# Patient Record
Sex: MALE | Race: WHITE
[De-identification: names, ages, dates, MRNs, and addresses within clinical notes are randomized per-mention and may not be internally consistent; named-entity substitution may affect disease eponyms.]

---

## 2018-07-24 LAB
BUN BLD-MCNC: 25 MG/DL (ref 7–18)
GLUCOSE SERPLBLD-MCNC: 99 MG/DL (ref 74–106)
HCT VFR BLD CALC: 43 % (ref 39.6–49)
LYMPHOCYTES # SPEC AUTO: 1.5 K/UL (ref 0.7–4.9)
MCH RBC QN AUTO: 30.8 PG (ref 27–35)
MCV RBC: 90.9 FL (ref 80–100)
PMV BLD: 8.8 FL (ref 7.6–11.3)
POTASSIUM SERPL-SCNC: 3.7 MMOL/L (ref 3.5–5.1)
RBC # BLD: 4.73 M/UL (ref 4.33–5.43)

## 2018-07-24 NOTE — EKG
Test Date:    2018-07-24               Test Time:    16:57:07

Technician:   KIKE                                    

                                                     

MEASUREMENT RESULTS:                                       

Intervals:                                           

Rate:         67                                     

TX:           160                                    

QRSD:         100                                    

QT:           420                                    

QTc:          443                                    

Axis:                                                

P:            35                                     

TX:           160                                    

QRS:          -12                                    

T:            42                                     

                                                     

INTERPRETIVE STATEMENTS:                                       

                                                     

Normal sinus rhythm

Nonspecific T wave abnormality

Abnormal ECG

Compared to ECG 12/08/2016 15:36:12

T-wave abnormality now present

ST (T wave) deviation no longer present



Electronically Signed On 07-24-18 17:56:28 CDT by Diogo Garvey

## 2018-07-25 ENCOUNTER — HOSPITAL ENCOUNTER (OUTPATIENT)
Dept: HOSPITAL 97 - OR | Age: 63
Discharge: HOME HEALTH | End: 2018-07-25
Attending: SURGERY
Payer: COMMERCIAL

## 2018-07-25 VITALS — SYSTOLIC BLOOD PRESSURE: 133 MMHG | OXYGEN SATURATION: 97 % | TEMPERATURE: 97 F | DIASTOLIC BLOOD PRESSURE: 77 MMHG

## 2018-07-25 DIAGNOSIS — F17.210: ICD-10-CM

## 2018-07-25 DIAGNOSIS — L02.215: Primary | ICD-10-CM

## 2018-07-25 DIAGNOSIS — E78.00: ICD-10-CM

## 2018-07-25 DIAGNOSIS — L72.0: ICD-10-CM

## 2018-07-25 DIAGNOSIS — I10: ICD-10-CM

## 2018-07-25 PROCEDURE — 87075 CULTR BACTERIA EXCEPT BLOOD: CPT

## 2018-07-25 PROCEDURE — 36415 COLL VENOUS BLD VENIPUNCTURE: CPT

## 2018-07-25 PROCEDURE — 93005 ELECTROCARDIOGRAM TRACING: CPT

## 2018-07-25 PROCEDURE — 80048 BASIC METABOLIC PNL TOTAL CA: CPT

## 2018-07-25 PROCEDURE — 88305 TISSUE EXAM BY PATHOLOGIST: CPT

## 2018-07-25 PROCEDURE — 88304 TISSUE EXAM BY PATHOLOGIST: CPT

## 2018-07-25 PROCEDURE — 0J9B0ZZ DRAINAGE OF PERINEUM SUBCUTANEOUS TISSUE AND FASCIA, OPEN APPROACH: ICD-10-PCS

## 2018-07-25 PROCEDURE — 0WBMXZX EXCISION OF MALE PERINEUM, EXTERNAL APPROACH, DIAGNOSTIC: ICD-10-PCS

## 2018-07-25 PROCEDURE — 87070 CULTURE OTHR SPECIMN AEROBIC: CPT

## 2018-07-25 PROCEDURE — 85025 COMPLETE CBC W/AUTO DIFF WBC: CPT

## 2018-07-25 PROCEDURE — 87205 SMEAR GRAM STAIN: CPT

## 2018-07-25 NOTE — OP
Date of Procedure:  07/25/2018



Surgeon:  Sid Calvo MD



Preoperative Diagnosis:  Tender perianal subcutaneous mass.



Postoperative Diagnoses:  Tender perianal subcutaneous mass, perianal abscess.



Procedures:  

1.Excisional biopsy of tender perineal subcutaneous mass.

2.Drainage of complex perineal abscess, 8 x 4 x 2 cm.



Specimen:  Cyst with abscess.



Findings:  The patient had a complex perineal abscess about 8 x 4 x 2 cm with multiple loculations, t
unneled anterior to the right of the base of the scrotum, although does not go inside the scrotum.



Anesthesia:  General plus local.



Indications:  This is a case of a male, who came to us with perineal tenderness and the mass.  It has
 drained in the past, not any more.  It is tender, it can barely be seen.  It is palpated.  Benefits,
 alternatives, and risks of excision were fully explained, which include, but are not limited to infe
ction, bleeding, damage to adjacent structures, anesthesia complication, MI, even death.  He also und
erstands this may not relieve any symptoms.  He might need more than one surgical intervention.  I di
scussed the case with his cardiologist this morning from [facility], who gave us clearance to this procedur
e, although this patient has a pending angiogram in next few weeks that it should be done.  The patie
nt understood.



Description Of Procedure:  The patient was brought to the operating room.  The area of concern was ma
rked by me and the patient.  The patient was placed in supine position.  Anesthesia was done without 
complication.  The perineal area was prepped and draped in a sterile fashion after putting him in lit
hotomy position.  An incision was made over the area of concern.  Immediately we noticed the patient 
has a cystic mass in that area with an underlying abscess.  The mass was excised that led us into an 
abscess cavity that tracked posteriorly, but it does not go into the rectum or near the anus, go ante
riorly into the base of the scrotum on the right side, but does not penetrate the scrotum.  Multiple 
loculations were explored and opened.  Cultures were obtained.  Irrigation was done and then the area
 was packed with quarter of an inch Nu Gauze.  The patient tolerated the procedure well.  Sponge coun
t and instrument counts were correct.  Local anesthetic was applied over the area.  The patient sent 
to recovery in stable condition.





HM/MODL

DD:  07/25/2018 14:02:21Voice ID:  [id number]

DT:  07/25/2018 14:19:42Report ID:  [id number]

## 2018-07-25 NOTE — DS
Date of Discharge:  07/25/2018



Diagnosis:  Tender perineal subcutaneous mass.



Procedure:  Excisional biopsy of tender perineal subcutaneous mass and drainage of complex perineal a
bscess.



Disposition:  Home.



Activity:  As tolerated.  No lifting.  The patient advises home health agency for Nu Gauze packing qu
arter of an inch daily follow in my office in 1 week.  Call for appointment on [phone].



Medications:  See orders.





ÁNGEL/LIANA

DD:  07/25/2018 14:02:21Voice ID:  [id number]

DT:  07/25/2018 14:20:52Report ID:  [id number]

## 2018-07-25 NOTE — P.BOP
Preoperative diagnosis: tender Perineal subcutaneous mass


Postoperative diagnosis: same plus perineal abscess


Primary procedure: 1. Excisional biosy of tender Perineal subcutaneous mass 


Secondary procedure: 2. drainage of complex perineal abscess [id number]


Estimated blood loss: <10cc


Specimen: cyst and abscess


Findings: complex perineal abscess [id number] wit multiple loculation tunneling 

anterior


Anesthesia: General


Complications: None


Drain(s): Other


Transferred to: Recovery Room


Condition: Good

## 2019-06-05 ENCOUNTER — HOSPITAL ENCOUNTER (OUTPATIENT)
Dept: HOSPITAL 97 - OR | Age: 64
Discharge: HOME | End: 2019-06-05
Attending: SURGERY
Payer: COMMERCIAL

## 2019-06-05 VITALS — TEMPERATURE: 97.5 F | SYSTOLIC BLOOD PRESSURE: 109 MMHG | DIASTOLIC BLOOD PRESSURE: 69 MMHG | OXYGEN SATURATION: 97 %

## 2019-06-05 DIAGNOSIS — Z79.899: ICD-10-CM

## 2019-06-05 DIAGNOSIS — L03.315: ICD-10-CM

## 2019-06-05 DIAGNOSIS — F17.210: ICD-10-CM

## 2019-06-05 DIAGNOSIS — L02.215: Primary | ICD-10-CM

## 2019-06-05 DIAGNOSIS — E78.00: ICD-10-CM

## 2019-06-05 DIAGNOSIS — Z95.5: ICD-10-CM

## 2019-06-05 DIAGNOSIS — Z79.01: ICD-10-CM

## 2019-06-05 DIAGNOSIS — K21.9: ICD-10-CM

## 2019-06-05 DIAGNOSIS — I25.2: ICD-10-CM

## 2019-06-05 DIAGNOSIS — I11.9: ICD-10-CM

## 2019-06-05 LAB
BUN BLD-MCNC: 18 MG/DL (ref 7–18)
GLUCOSE SERPLBLD-MCNC: 118 MG/DL (ref 74–106)
HCT VFR BLD CALC: 39.6 % (ref 39.6–49)
LYMPHOCYTES # SPEC AUTO: 1.4 K/UL (ref 0.7–4.9)
PMV BLD: 8.2 FL (ref 7.6–11.3)
POTASSIUM SERPL-SCNC: 4.1 MMOL/L (ref 3.5–5.1)
RBC # BLD: 4.46 M/UL (ref 4.33–5.43)

## 2019-06-05 PROCEDURE — 80048 BASIC METABOLIC PNL TOTAL CA: CPT

## 2019-06-05 PROCEDURE — 36415 COLL VENOUS BLD VENIPUNCTURE: CPT

## 2019-06-05 PROCEDURE — 85025 COMPLETE CBC W/AUTO DIFF WBC: CPT

## 2019-06-05 PROCEDURE — 0J9B0ZZ DRAINAGE OF PERINEUM SUBCUTANEOUS TISSUE AND FASCIA, OPEN APPROACH: ICD-10-PCS

## 2019-06-05 PROCEDURE — 71046 X-RAY EXAM CHEST 2 VIEWS: CPT

## 2019-06-05 PROCEDURE — 93005 ELECTROCARDIOGRAM TRACING: CPT

## 2019-06-05 RX ADMIN — BUPIVACAINE HYDROCHLORIDE ONE ML: 5 INJECTION, SOLUTION EPIDURAL; INTRACAUDAL; PERINEURAL at 10:22

## 2019-06-05 RX ADMIN — BUPIVACAINE HYDROCHLORIDE ONE ML: 5 INJECTION, SOLUTION EPIDURAL; INTRACAUDAL; PERINEURAL at 10:27

## 2019-06-05 NOTE — RAD REPORT
EXAM DESCRIPTION:  RAD - Chest Pa And Lat (2 Views) - 6/5/2019 9:34 am

 

CLINICAL HISTORY:  preop

Chest pain.

 

COMPARISON:  Abdomen 1 View (KUB) dated 6/7/2016; Abdomen 1 View (KUB) dated 5/22/2016; Abdomen 1 Vie
w (KUB) dated 5/20/2016; ABDOMEN 1 VIEW KUB dated 3/26/2013; Outpt Chest Pa/Lat (2 Views) dated 12/8/
2016

 

FINDINGS:  Emphysematous changes are present throughout the lungs. The heart is normal in size. No di
splaced fractures.

 

IMPRESSION:  Advanced COPD.

## 2019-06-05 NOTE — XMS REPORT
[facility]

 Created on:2018



Patient:ALFONSO BLANK

Sex:Male

:1955

External Reference #:[id number]





Demographics







 Address  [address] [place] [address]

 

 Phone  [phone]

 

 Preferred Language  en

 

 Marital Status  Unknown

 

 [employer] Affiliation  Unknown

 

 Race  White

 

 Ethnic Group  Unknown









Author







 Organization  [employer]









Care Team Providers







 Name  Role  Phone

 

 Danielito Crain  Provider Role  Unavailable









Allergies, Adverse Reactions, Alerts







 Substance  Reaction  Event Type

 

 N.K.D.A.  Info Not Available  Non Drug Allergy







Problems







 Problem Type  Condition  Code  Onset Dates  Condition Status

 

 Problem  Pain of joint of left ankle and  M25.572    Active



   foot      

 

 Problem  Pain, joint, shoulder, left  M25.512    Active

 

 Problem  Post-traumatic osteoarthritis of  M19.172    Active



   left ankle      

 

 Assessment  Post-traumatic osteoarthritis of  M19.172    Active



   left ankle      

 

 Assessment  Pain of joint of left ankle and  M25.572    Active



   foot      







Medications







 Medication  Code  Code  Instructions  Start  End  Status  Dosage



   System      Date  Date    

 

 Metoprolol  NDC  [id number]  25 MG Oral      Active  (Prior



 Tartrate              Auth: Rx



               Ref#:0000



               [id number])

 

 Tramadol-Acetamin  NDC  [id number]  37.5-325 MG      Active  (Schedule



 ophen      Oral        IV Drug)



               (Prior



               Auth: Rx



               Ref#:0000



               [id number])

 

 Chantix  NDC  [id number]  1 MG Oral      Active  (Prior



               Auth: Rx



               Ref#:0000



               [id number])

 

 Gabapentin  NDC  37329835691  300 MG Oral      Active  (Prior



               Auth: Rx



               Ref#:0000



               [id number])

 

 Aspirin  NDC  81428740516  81 MG Oral      Active  (Prior



               Auth: Rx



               Ref#:0000



               [id number])

 

 Carvedilol  NDC  42319981601  6.25 MG Oral      Active  (Prior



               Auth: Rx



               Ref#:0000



               [id number])

 

 ProAir HFA  NDC  [id number]  108 (90 Base)      Active  (Prior



       MCG/ACT        Auth: Rx



       Inhalation        Ref#:0000



               [id number])

 

 Sulfamethoxazole-  NDC  [id number]  800-160 MG Oral      Active  (Prior



 Trimethoprim              Auth: Rx



               Ref#:0000



               [id number])

 

 Nitroglycerin  NDC  [id number]  0.4 MG      Active  (Prior



       Sublingual        Auth: Rx



               Ref#:0000



               [id number])

 

 Trelegy Ellipta  NDC  73225136927  100-62.5-25      Active  (Prior



       MCG/INH        Auth: Rx



       Inhalation        Ref#:0000



               [id number])

 

 Spiriva  ND  [id number]  18 MCG      Active  (Prior



 HandiHaler      Inhalation        Auth: Rx



               Ref#:0000



               [id number])

 

 Atorvastatin  NDC  54361456811  40 MG Oral      Active  (Prior



 Calcium              Auth: Rx



               Ref#:0000



               [id number])

 

 Brilinta  NDC  99145237123  90 MG Oral      Active  (Prior



               Auth: Rx



               Ref#:0000



               [id number])

 

 Losartan  NDC  36649105347  50 MG Oral      Active  (Prior



 Potassium              Auth: Rx



               Ref#:0000



               [id number])







Results

No Known Results



Summary Purpose

[employer] Submission

## 2019-06-05 NOTE — EKG
Test Date:    2019-06-05               Test Time:    08:55:13

Technician:   SUN                                     

                                                     

MEASUREMENT RESULTS:                                       

Intervals:                                           

Rate:         64                                     

KS:           170                                    

QRSD:         102                                    

QT:           422                                    

QTc:          435                                    

Axis:                                                

P:            52                                     

KS:           170                                    

QRS:          33                                     

T:            51                                     

                                                     

INTERPRETIVE STATEMENTS:                                       

                                                     

Normal sinus rhythm

Incomplete right bundle branch block

Borderline ECG

Compared to ECG 07/24/2018 16:57:07

Incomplete right bundle-branch block now present

T-wave abnormality no longer present



Electronically Signed On 06-05-19 11:22:40 CDT by Ezequiel Milligan

## 2019-06-05 NOTE — P.BOP
Preoperative diagnosis: perineal abscess, on anticoagulation for heart disease


Postoperative diagnosis: same


Primary procedure: Incision and drainage of complex perineal abscess 6x4 cm


Estimated blood loss: <10cc


Findings: abscess


Anesthesia: General


Complications: None


Transferred to: Recovery Room


Condition: Good

## 2019-06-05 NOTE — XMS REPORT
Patient Summary Document

 Created on:2019



Patient:ALFONSO BLANK

Sex:Male

:1955

External Reference #:[id number]





Demographics







 Address  [address] [place] [address]

 

 Home Phone  [phone]

 

 Work Phone  [phone] w

 

 Email Address  [email or web address]

 

 Preferred Language  Unknown

 

 Marital Status  Unknown

 

 Sabianist Affiliation  Unknown

 

 Race  Unknown

 

 Additional Race(s)  Unavailable

 

 Ethnic Group  Unknown









Author







 Organization  [facility]

 

 Address  [address] [place] [address] Dr. Abdullahi [address] [place] [address]

 

 Phone  [phone]









Care Team Providers







 Name  Role  Phone

 

 Unavailable  Unavailable  Unavailable









Problems

This patient has no known problems.



Allergies, Adverse Reactions, Alerts

This patient has no known allergies or adverse reactions.



Medications

This patient has no known medications.

## 2019-06-06 NOTE — OP
Date of Procedure:  06/05/2019



Surgeon:  Sid Calvo MD



Preoprative Diagnosis:  Perineal abscess.  The patient has a history of heart disease and on current 
anticoagulation.



Postoperative Diagnosis:  Perineal abscess.  The patient has a history of heart disease and on curren
t anticoagulation.



Procedure:  Incision and drainage of complex perineal abscess of about 6 x 4 cm.



Anesthesia:  General plus local.



Findings:  A large complex abscess with multiple loculations present.



Indications:  This is a case of a 64-year-old patient, comes to us with a rapidly expanding abscess o
n the perineal region.  He had another 1 in the past several years ago, a little bit away from this a
sandy.  This new one in 2 days doubled in size.  I discussed the case with the cardiologist.  He does n
ot want me to stop the anticoagulation other than the aspirin and probably 1 dose of the other antico
agulation.  That make this case limit, since I anticipate the patient with blood thinners, but at the
 same time, he needs the incision and drainage because in the past he did not do this on time and it 
developed into a large cavity.  He understands the risks and we understand the risks.  Cardiologist yinka torrez explained to me the reason for this.  He had an MI about a year ago with stent placement, and e
slade though 6 months is okay just to off his anticoagulation, he preferred just to keep it going.  He 
understands the limitations of this surgery, which include obviously more bleeding than usual.  Benef
its, alternatives, and risks fully explained including, but not limited to infection, bleeding, damag
e to adjacent structures, anesthesia complication, recurrence, MI, and even death.  He also understan
ds this may not relieve in any symptoms, he might need more than 1 surgical intervention.  He underst
ood, signed a consent.



Description Of Procedure:  The patient was brought to the operating room, placed in supine position. 
 Anesthesia was done without complication.  The patient was placed in lithotomy position with proper 
protection.  Perineal area was prepped and draped in sterile fashion.  An incision was made over the 
area of the abscess and a discharge was obtained with purulent discharge for culture.  All the locula
tions were explored and opened.  Profuse irrigation was done over that area.  Absolute hemostasis was
 obtained and then the area was packed with wet-to-dry dressing.  The patient tolerated the procedure
 well.  The patient was sent to recovery room in stable condition.



Disposition:  Home.



Activity:  As tolerated.  No heavy lifting.



Followup:  Follow up in my office in 1 week.  Call for appointment, [phone].  The patient will do we
t-to-dry dressing daily.  The patient's wife states she feels comfortable doing the dressing changes.
  We will see the patient in 1 week.



Medications:  See orders.





ÁNGEL/LIANA

DD:  06/05/2019 16:57:31Voice ID:  [id number]

DT:  06/06/2019 03:21:59Report ID:  [id number]

## 2022-08-09 ENCOUNTER — HOSPITAL ENCOUNTER (EMERGENCY)
Dept: HOSPITAL 97 - ER | Age: 67
Discharge: HOME | End: 2022-08-09
Payer: COMMERCIAL

## 2022-08-09 VITALS — TEMPERATURE: 97.4 F | DIASTOLIC BLOOD PRESSURE: 80 MMHG | SYSTOLIC BLOOD PRESSURE: 160 MMHG | OXYGEN SATURATION: 97 %

## 2022-08-09 DIAGNOSIS — I10: ICD-10-CM

## 2022-08-09 DIAGNOSIS — K56.41: Primary | ICD-10-CM

## 2022-08-09 DIAGNOSIS — F17.210: ICD-10-CM

## 2022-08-09 LAB
ALBUMIN SERPL BCP-MCNC: 3.4 G/DL (ref 3.4–5)
ALP SERPL-CCNC: 130 U/L (ref 45–117)
ALT SERPL W P-5'-P-CCNC: 25 U/L (ref 12–78)
AST SERPL W P-5'-P-CCNC: 8 U/L (ref 15–37)
BUN BLD-MCNC: 18 MG/DL (ref 7–18)
GLUCOSE SERPLBLD-MCNC: 175 MG/DL (ref 74–106)
HCT VFR BLD CALC: 36.9 % (ref 39.6–49)
LIPASE SERPL-CCNC: 46 U/L (ref 73–393)
LYMPHOCYTES # SPEC AUTO: 1.6 K/UL (ref 0.7–4.9)
MCV RBC: 86.6 FL (ref 80–100)
PMV BLD: 7.3 FL (ref 7.6–11.3)
POTASSIUM SERPL-SCNC: 4 MMOL/L (ref 3.5–5.1)
RBC # BLD: 4.27 M/UL (ref 4.33–5.43)

## 2022-08-09 PROCEDURE — 85025 COMPLETE CBC W/AUTO DIFF WBC: CPT

## 2022-08-09 PROCEDURE — 83690 ASSAY OF LIPASE: CPT

## 2022-08-09 PROCEDURE — 36415 COLL VENOUS BLD VENIPUNCTURE: CPT

## 2022-08-09 PROCEDURE — 80053 COMPREHEN METABOLIC PANEL: CPT

## 2022-08-09 NOTE — XMS REPORT
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           



                                                  needed for           



                                                  nausea.           

 

     traMADol      2-0      Yes       [id number] 100mg Q6H  Take 2           [facility]



     (Ultram) 50      5-04                               tablets           [facility]

h



     MG tablet      00:00:                               (100 mg           



               00                                 total) by           



                                                  mouth           



                                                  every 6           



                                                  (six)           



                                                  hours if           



                                                  needed for           



                                                  severe           



                                                  pain.           

 

     ondansetron      2-0      Yes       [id number] 8mg       Take 1          

 [facility]



     (Zofran) 8      5-04                               tablet (8           [facility]

th



     MG tablet      00:00:                               mg total)           



               00                                 by mouth           



                                                  every 8           



                                                  (eight)           



                                                  hours if           



                                                  needed for           



                                                  nausea.           

 

     traMADol      2022-0      Yes       [id number] 100mg Q6H  Take 2           [facility]



     (Ultram) 50      5-04                               tablets           [facility]

h



     MG tablet      00:00:                               (100 mg           



               00                                 total) by           



                                                  mouth           



                                                  every 6           



                                                  (six)           



                                                  hours if           



                                                  needed for           



                                                  severe           



                                                  pain.           

 

     ondansetron      2022-0      Yes       [id number] 8mg       Take 1          

 [facility]



     (Zofran) 8      5-04                               tablet (8           [facility]

th



     MG tablet      00:00:                               mg total)           



               00                                 by mouth           



                                                  every 8           



                                                  (eight)           



                                                  hours if           



                                                  needed for           



                                                  nausea.           

 

     gabapentin      2022-0      Yes            300mg      Take 300           [facility]

[facility]



     (NEURONTIN)      4-06                               mg by           [facility] of



     300 mg      13:01:                               mouth           [place]



     capsule      34                                 daily.           Medical



                                                                 Branch

 

     lansoprazol            Yes            15mg      Take 15 mg           

[facility]



     e         4-06                               by mouth 2           [facility]



     (PREVACID)      13:01:                               (two)           [place]



     15 mg      34                                 times           Medical



     capsule                                         daily.           Branch

 

     ascorbic      0      Yes            500mg      Take 500           [facility]

ers



     acid      4-06                               mg by           [facility] of



     (VITAMIN C)      13:01:                               mouth           [place]



     500 mg      34                                 daily.           Medical



     tablet                                                        Branch

 

     Docusate            Yes       [id number] 100mg      Take 100           

[facility]



     Sodium 100      4-06                               mg by           ity of



     mg tablet      13:01:                               mouth           [place]



               34                                 daily.           Medical



                                                                 Branch

 

     dutasteride            Yes            .5mg      Take 0.5           Un

an[facility]



     0.5 mg      4-06                               mg by           ity of



     capsule      13:01:                               mouth           [place]



               34                                 daily.           Medical



                                                                 Branch

 

     levocetiriz            Yes            5mg       Take 5 mg           U

nivers



     ine 5 mg      4-06                               by mouth           [facility] of



     tablet      13:01:                               every           [place]



               34                                 evening.           Medical



                                                                 Branch

 

     montelukast            Yes            10mg      Take 10 mg           

[facility]



     10 mg      4-06                               by mouth.           [facility]



     tablet      13:01:                                              [place]



               34                                                Medical



                                                                 Branch

 

     insulin            Yes            20U       inject 20           [facility]ive

rs



     degludec      4-06                               Units           ity of



     200 unit/mL      13:01:                               under the           [facility]

[facility]



     (3 mL) InPn      34                                 skin.           Medical



                                                  Inject 20           Branch



                                                  Units           



                                                  Daily           

 

     gabapentin            Yes            300mg      Take 300           Un

[facility]



     (NEURONTIN)      4-06                               mg by           ity of



     300 mg      13:01:                               mouth           [place]



     capsule      34                                 daily.           Medical



                                                                 Branch

 

     lansoprazol            Yes            15mg      Take 15 mg           

[facility]



     e         4-06                               by mouth 2           [facility]



     (PREVACID)      13:01:                               (two)           [place]



     15 mg      34                                 times           Medical



     capsule                                         daily.           Branch

 

     ascorbic            Yes            500mg      Take 500           [facility]

ers



     acid      4-06                               mg by           ity of



     (VITAMIN C)      13:01:                               mouth           [place]



     500 mg      34                                 daily.           Medical



     tablet                                                        Branch

 

     Docusate            Yes       [id number] 100mg      Take 100           

[facility]



     Sodium 100      4-06                               mg by           ity of



     mg tablet      13:01:                               mouth           [place]



               34                                 daily.           Medical



                                                                 Branch

 

     dutasteride      0      Yes            .5mg      Take 0.5           Un

an[facility]



     0.5 mg      4-06                               mg by           ity of



     capsule      13:01:                               mouth           [place]



               34                                 daily.           Medical



                                                                 Branch

 

     levocetiriz      0      Yes            5mg       Take 5 mg           U

nivers



     ine 5 mg      4-06                               by mouth           ity of



     tablet      13:01:                               every           [place]



               34                                 evening.           Medical



                                                                 Branch

 

     montelukast      0      Yes            10mg      Take 10 mg           

[facility]



     10 mg      4-06                               by mouth.           ity of



     tablet      13:01:                                              [place]



               34                                                Medical



                                                                 Branch

 

     insulin      -0      Yes            20U       inject 20           Unive

rs



     degludec      4-06                               Units           ity of



     200 unit/mL      13:01:                               under the           [facility]

[facility]



     [facility]                                 [facility]           [facility]



                                                  Inject 20           [facility]



                                                  Units           



                                                  Daily           

 

     ezetimibe      0      Yes       [id number] 10mg      Take 1           U

nivers



     10 mg      4-06                               tablet by           ity of



     tablet      00:00:                               mouth           [place]



               00                                 daily.           Medical



                                                                 Branch

 

     atorvastati            Yes       [id number] 80mg      Take 1          

 [facility]



     n 80 mg      4-06                               tablet by           ity of



     tablet      00:00:                               mouth           [place]



               00                                 daily.           Medical



                                                                 Branch

 

     carvediloL      0      Yes       [id number] 25mg      Take 1           

[facility]



     25 mg      4-06                               tablet by           ity of



     tablet      00:00:                               mouth 2           [place]



               00                                 (two)           Medical



                                                  times           Branch



                                                  daily.           

 

     NIFEdipine      -0      Yes       [id number] 30mg      Take 1           

[facility]



     XL 30 mg 24      4-06                               tablet by           ity

 of



     hr tablet      00:00:                               mouth 2           [place]



               00                                 (two)           Medical



                                                  times           Branch



                                                  daily.           

 

     ezetimibe      0      Yes       [id number] 10mg      Take 1           U

nivers



     10 mg      4-06                               tablet by           ity of



     tablet      00:00:                               mouth           [place]



               00                                 daily.           Medical



                                                                 Branch

 

     atorvastati      0      Yes       [id number] 80mg      Take 1          

 [facility]



     n 80 mg      4-06                               tablet by           ity of



     tablet      00:00:                               mouth           [place]



               00                                 daily.           Medical



                                                                 Branch

 

     carvediloL      0      Yes       [id number] 25mg      Take 1           

[facility]



     25 mg      4-06                               tablet by           ity of



     tablet      00:00:                               mouth 2           [place]



               00                                 (two)           Medical



                                                  times           Branch



                                                  daily.           

 

     NIFEdipine      -0      Yes       [id number] 30mg      Take 1           

[facility]



     XL 30 mg 24      4-06                               tablet by           ity

 of



     hr tablet      00:00:                               mouth 2           [place]



               00                                 (two)           Medical



                                                  times           Branch



                                                  daily.           

 

     NIFEdipine      -0 2022- No             30mg      Take 30 mg           

[facility]



     XL 30 mg 24      3- 04-06                          by mouth           ity

 of



     hr tablet      00:00: 00:00                          daily.           [place]



               00   :00                                          [facility]

 

     aspirin 81      -0      Yes                      (Prior           [facility]



     MG chewable      3-18                               Auth: Rx           [facility]

th



     tablet      09:54:                               Ref#:[id number]           



               58                                 [id number])           

 

     Fluticasone      -0      Yes                      (Prior           [facility]



     -Umeclidin-      3-18                               Auth: Rx           [facility]al

th



     [facility]      09:54:                               Ref#:[id number]           



     ([facility]      58                                 [id number])           



     Ellipta)                                                        



     100-62.5-25                                                        



     MCG/INH                                                        



     aerosol                                                        



     powder                                                        

 

     lansoprazol      2-0      Yes            15mg      Take 15 mg           

[facility]



     e         3-18                               by mouth.           Health



     (Prevacid)      09:54:                                              



     15 MG DR      58                                                



     capsule                                                        

 

     montelukast      -0      Yes            10mg      Take 10 mg           

[facility]



     (Singulair)      3-18                               by mouth.           Hea

lth



     10 MG      09:54:                                              



     tablet      58                                                

 

     aspirin 81      -0      Yes                      (Prior           [facility]



     MG chewable      3-18                               Auth: Rx           [facility]al

th



     tablet      09:54:                               Ref#:[id number]           



               58                                 [id number])           

 

     Fluticasone      -0      Yes                      (Prior           [facility]



     -Umeclidin-      3-18                               Auth: Rx           Heal

th



     [facility]      09:54:                               Ref#:[id number]           



     ([facility]      58                                 [id number])           



     Ellipta)                                                        



     100-62.5-25                                                        



     MCG/INH                                                        



     aerosol                                                        



     powder                                                        

 

     lansoprazol      -0      Yes            15mg      Take 15 mg           

[facility]



     e         3-18                               by mouth.           Health



     (Prevacid)      09:54:                                              



     15 MG DR      58                                                



     capsule                                                        

 

     montelukast      -0      Yes            10mg      Take 10 mg           

[facility]



     (Singulair)      3-18                               by mouth.           Hea

lth



     10 MG      09:54:                                              



     tablet      58                                                

 

     aspirin 81      -0      Yes                      (Prior           [facility]



     MG chewable      3-18                               Auth: Rx           Heal

th



     tablet      09:54:                               Ref#:[id number]           



               58                                 [id number])           

 

     Fluticasone      -0      Yes                      (Prior           [facility]



     -Umeclidin-      3-18                               Auth: Rx           Heal

th



     [facility]      09:54:                               Ref#:[id number]           



     ([facility]      58                                 [id number])           



     Ellipta)                                                        



     100-62.5-25                                                        



     MCG/INH                                                        



     aerosol                                                        



     powder                                                        

 

     lansoprazol      2-0      Yes            15mg      Take 15 mg           

[facility]



     e         3-18                               by mouth.           Health



     (Prevacid)      09:54:                                              



     15 MG DR      58                                                



     capsule                                                        

 

     montelukast      2022-0      Yes            10mg      Take 10 mg           

[facility]



     (Singulair)      3-18                               by mouth.           [facility]

lth



     10 MG      09:54:                                              



     tablet      58                                                

 

     aspirin 81      2-0      Yes                      (Prior           [facility]



     MG chewable      3-18                               Auth: Rx           Heal

th



     tablet      09:54:                               Ref#:[id number]           



               58                                 [id number])           

 

     Fluticasone      2022-0      Yes                      (Prior           [facility]



     -Umeclidin-      3-18                               Auth: Rx           Heal

th



     [facility]      09:54:                               Ref#:[id number]           



     ([facility]      58                                 [id number])           



     Ellipta)                                                        



     100-62.5-25                                                        



     MCG/INH                                                        



     aerosol                                                        



     powder                                                        

 

     lansoprazol      2022-0      Yes            15mg      Take 15 mg           

[facility]



     e         3-18                               by mouth.           Health



     (Prevacid)      09:54:                                              



     15 MG DR      58                                                



     capsule                                                        

 

     montelukast      2-0      Yes            10mg      Take 10 mg           

[facility]



     (Singulair)      3-18                               by mouth.           [facility]a

lth



     10 MG      09:54:                                              



     tablet      58                                                

 

     aspirin 81      -0      Yes                      (Prior           [facility]



     MG chewable      3-18                               Auth: Rx           Heal

th



     tablet      09:54:                               Ref#:[id number]           



               58                                 [id number])           

 

     Fluticasone      2-0      Yes                      (Prior           [facility]



     -Umeclidin-      3-18                               Auth: Rx           [facility]al

th



     [facility]      09:54:                               Ref#:[id number]           



     [facility]      [facility]                                 [id number])           



     Ellipta)                                                        



     100-62.5-25                                                        



     MCG/INH                                                        



     aerosol                                                        



     powder                                                        

 

     lansoprazol      2-0      Yes            15mg      Take 15 mg           

[facility]



     e         3-18                               by mouth.           Health



     (Prevacid)      09:54:                                              



     15 MG DR      58                                                



     capsule                                                        

 

     montelukast      2022-0      Yes            10mg      Take 10 mg           

[facility]



     (Singulair)      3-18                               by mouth.           [facility]a

lth



     10 MG      09:54:                                              



     tablet      58                                                

 

     aspirin 81      -0      Yes                      (Prior           [facility]



     MG chewable      3-18                               Auth: Rx           [facility]al

th



     tablet      09:54:                               Ref#:[id number]           



               58                                 [id number])           

 

     Fluticasone      2022-0      Yes                      (Prior           [facility]



     -Umeclidin-      3-18                               Auth: Rx           Heal

th



     [facility]      09:54:                               Ref#:[id number]           



     ([facility]      58                                 [id number])           



     Ellipta)                                                        



     100-62.5-25                                                        



     MCG/INH                                                        



     aerosol                                                        



     powder                                                        

 

     lansoprazol      2022-0      Yes            15mg      Take 15 mg           

[facility]



     e         3-18                               by mouth.           Health



     (Prevacid)      09:54:                                              



     15 MG DR      58                                                



     capsule                                                        

 

     montelukast            Yes            10mg      Take 10 mg           

[facility]



     (Singulair)      3-18                               by mouth.           [facility]

lth



     10 MG      09:54:                                              



     tablet      58                                                

 

     aspirin 81            Yes                      (Prior           [facility]



     MG chewable      3-18                               Auth: Rx           [facility]al

th



     tablet      09:54:                               Ref#:[id number]           



               58                                 [id number])           

 

     Fluticasone            Yes                      (Prior           [facility]



     -Umeclidin-      3-18                               Auth: Rx           [facility]

[facility]



     Vilant      09:54:                               Ref#:[id number]           



     (Trelegy      58                                 [id number])           



     Ellipta)                                                        



     100-62.5-25                                                        



     MCG/INH                                                        



     aerosol                                                        



     powder                                                        

 

     lansoprazol            Yes            15mg      Take 15 mg           

[facility]



     e         3-18                               by mouth.           Health



     (Prevacid)      09:54:                                              



     15 MG DR      58                                                



     capsule                                                        

 

     montelukast            Yes            10mg      Take 10 mg           

[facility]



     (Singulair)      3-18                               by mouth.           [facility]

[facility]



     10 MG      09:54:                                              



     tablet      58                                                

 

     Tresiba            Yes                      INJECT 55           [facility]



     FlexTouch      3-11                               UNITS           Health



     200 UNIT/ML      00:00:                               SUBCUTANEO           



     injection      00                                 USLY ONCE           



                                                  DAILY AT           



                                                  THE SAME           



                                                  TIME EACH           



                                                  DAY.           

 

     Tresiba            Yes                      INJECT 55           [facility]



     FlexTouch      3-11                               UNITS           Health



     200 UNIT/ML      00:00:                               SUBCUTANEO           



     injection      00                                 USLY ONCE           



                                                  DAILY AT           



                                                  THE SAME           



                                                  TIME EACH           



                                                  DAY.           

 

     Tresiba            Yes                      INJECT 55           [facility]



     FlexTouch      3-11                               UNITS           Health



     200 UNIT/ML      00:00:                               SUBCUTANEO           



     injection      00                                 USLY ONCE           



                                                  DAILY AT           



                                                  THE SAME           



                                                  TIME EACH           



                                                  DAY.           

 

     Tresiba            Yes                      INJECT 55           [facility]



     FlexTouch      3-11                               UNITS           Health



     200 UNIT/ML      00:00:                               SUBCUTANEO           



     injection      00                                 USLY ONCE           



                                                  DAILY AT           



                                                  THE SAME           



                                                  TIME EACH           



                                                  DAY.           

 

     Tresiba            Yes                      INJECT 55           [facility]



     FlexTouch      3-11                               UNITS           Health



     200 UNIT/ML      00:00:                               SUBCUTANEO           



     injection      00                                 USLY ONCE           



                                                  DAILY AT           



                                                  THE SAME           



                                                  TIME EACH           



                                                  DAY.           

 

     Tresiba            Yes                      INJECT 55           [facility]



     FlexTouch      3-11                               UNITS           Health



     200 UNIT/ML      00:00:                               SUBCUTANEO           



     injection      00                                 USLY ONCE           



                                                  DAILY AT           



                                                  THE SAME           



                                                  TIME EACH           



                                                  DAY.           

 

     Tresiba      -0      Yes                      INJECT 55           [facility]



     FlexTouch      3-11                               UNITS           Health



     200 UNIT/ML      00:00:                               SUBCUTANEO           



     injection      00                                 USLY ONCE           



                                                  DAILY AT           



                                                  THE SAME           



                                                  TIME EACH           



                                                  DAY.           

 

     losartan      -0      Yes                                     [facility]



     (Cozaar) 50      1-06                                              Health



     MG tablet      00:00:                                              



               00                                                

 

     losartan      -0      Yes                                     [facility]



     (Cozaar) 50      1-06                                              [facility]



     MG tablet      00:00:                                              



               00                                                

 

     losartan      -0      Yes                                     [facility]



     (Cozaar) 50      1-06                                              [facility]



     MG tablet      00:00:                                              



               00                                                

 

     losartan      -0      Yes                                     [facility]



     (Cozaar) 50      1-06                                              [facility]



     MG tablet      00:00:                                              



               00                                                

 

     losartan      -0      Yes                                     [facility]



     (Cozaar) 50      1-06                                              [facility]



     MG tablet      00:00:                                              



               00                                                

 

     losartan      -0      Yes                                     [facility]



     (Cozaar) 50      1-06                                              [facility]



     MG tablet      00:00:                                              



               00                                                

 

     losartan      -0      Yes                                     [facility]



     (Cozaar) 50      1-06                                              [facility]



     MG tablet      00:00:                                              



               00                                                

 

     losartan 50      -0      Yes            50mg      Take 50 mg           

[facility]



     mg tablet      -06                               by mouth 2           [facility] 

of



               00:00:                               (two)           [place]



               00                                 times           [facility]



                                                  daily.           [place]

 

     losartan 50      -0      Yes            50mg      Take 50 mg           

[facility]



     mg tablet      -06                               by mouth 2           [facility] 

of



               00:00:                               (two)           [place]



               00                                 times           [facility]



                                                  daily.           [facility]

 

     gabapentin      -0      Yes                      TAKE ONE           [facility]



     (Neurontin)      1-04                               (1)            [facility]



     300 MG      00:00:                               CAPSULE(S)           



     capsule      00                                 BY MOUTH           



                                                  TWICE A           



                                                  DAY AT 12           



                                                  HOUR           



                                                  INTERVALS.           

 

     gabapentin      -0      Yes                      TAKE ONE           [facility]



     (Neurontin)      1-04                               (1)            [facility]



     300 MG      00:00:                               CAPSULE(S)           



     capsule      00                                 BY MOUTH           



                                                  TWICE A           



                                                  DAY AT 12           



                                                  HOUR           



                                                  INTERVALS.           

 

     gabapentin      -0      Yes                      TAKE ONE           [facility]



     (Neurontin)      1-04                               (1)            [facility]



     300 MG      00:00:                               CAPSULE(S)           



     capsule      00                                 BY MOUTH           



                                                  TWICE A           



                                                  DAY AT 12           



                                                  HOUR           



                                                  INTERVALS.           

 

     gabapentin      -0      Yes                      TAKE ONE           [facility]



     (Neurontin)      1-04                               (1)            [facility]



     300 MG      00:00:                               CAPSULE(S)           



     capsule      00                                 BY MOUTH           



                                                  TWICE A           



                                                  DAY AT 12           



                                                  HOUR           



                                                  INTERVALS.           

 

     gabapentin      -0      Yes                      TAKE ONE           [facility]



     (Neurontin)      1-04                               (1)            [facility]



     300 MG      00:00:                               CAPSULE(S)           



     capsule      00                                 BY MOUTH           



                                                  TWICE A           



                                                  DAY AT 12           



                                                  HOUR           



                                                  INTERVALS.           

 

     gabapentin      0      Yes                      TAKE ONE           [facility]



     (Neurontin)                                     (1)            [facility]



     300 MG      00:00:                               CAPSULE(S)           



     capsule      00                                 BY MOUTH           



                                                  TWICE A           



                                                  DAY AT 12           



                                                  HOUR           



                                                  INTERVALS.           

 

     gabapentin      0      Yes                      TAKE ONE           [facility]



     (Neurontin)                                     (1)            [facility]



     300 MG      00:00:                               CAPSULE(S)           



     capsule      00                                 BY MOUTH           



                                                  TWICE A           



                                                  DAY AT 12           



                                                  HOUR           



                                                  INTERVALS.           

 

     atorvastati      0      Yes                                     [facility]



     n (Lipitor)      1-                                              [facility]



     80 MG      00:00:                                              



     tablet      00                                                

 

     carvedilol      0      Yes                                     [facility]



     (Coreg) 25      -                                              [facility]



     MG tablet      00:00:                                              



               00                                                

 

     atorvastati      0      Yes                                     [facility]



     n (Lipitor)                                                    [facility]



     80 MG      00:00:                                              



     tablet      00                                                

 

     carvedilol      0      Yes                                     [facility]



     (Coreg) 25      -                                              [facility]



     MG tablet      00:00:                                              



               00                                                

 

     atorvastati      -0      Yes                                     [facility]



     n (Lipitor)      1-                                              [facility]



     80 MG      00:00:                                              



     tablet      00                                                

 

     carvedilol      -0      Yes                                     [facility]



     (Coreg) 25      1-                                              Health



     MG tablet      00:00:                                              



               00                                                

 

     atorvastati      -0      Yes                                     [facility]



     n (Lipitor)      1-                                              [facility]



     80 MG      00:00:                                              



     tablet      00                                                

 

     carvedilol      -0      Yes                                     [facility]



     (Coreg) 25      1-                                              [facility]



     MG tablet      00:00:                                              



               00                                                

 

     atorvastati      -0      Yes                                     [facility]



     n (Lipitor)      1-                                              Health



     80 MG      00:00:                                              



     tablet      00                                                

 

     carvedilol      -0      Yes                                     [facility]



     (Coreg) 25      1-                                              Health



     MG tablet      00:00:                                              



               00                                                

 

     atorvastati      -0      Yes                                     [facility]



     n (Lipitor)      1-                                              [facility]



     80 MG      00:00:                                              



     tablet      00                                                

 

     carvedilol      -0      Yes                                     [facility]



     (Coreg) 25      -                                              [facility]



     MG tablet      00:00:                                              



               00                                                

 

     atorvastati      -0      Yes                                     [facility]



     n (Lipitor)      -                                              [facility]



     80 MG      00:00:                                              



     tablet      00                                                

 

     carvedilol      -0      Yes                                     [facility]



     (Coreg) 25      -                                              Health



     MG tablet      00:00:                                              



               00                                                

 

     atorvastati      -0 2022- No             80mg      Take 80 mg          

 [facility]



     n 80 mg       04-06                          by mouth           [facility]



     tablet      00:00: 00:00                          daily.           [place]



               00   :00                                          [facility]



                                                                 [facility]

 

     carvediloL      0 - No             25mg      Take 25 mg           

[facility]



     25 mg                                by mouth 2           [facility]



     tablet      00:00: 00:00                          (two)           [place]



               00   :00                           times           [facility]



                                                  daily.           [facility]

 

     albuterol      0      Yes       [id number] 2{puff}      Inhale 2       

    [facility]



     90        5-16                               Puffs           [facility] of



     mcg/actuati      00:00:                               every 4           [facility]

[facility]



     on inhaler      00                                 (four)           [facility]



                                                  hours as           [facility]



                                                  needed for           



                                                  Wheezing           



                                                  or             



                                                  Shortness           



                                                  of Breath.           

 

     albuterol            Yes       [id number] 2{puff}      Inhale 2       

    [facility]



     90        5-16                               Puffs           [facility] of



     mcg/actuati      00:00:                               every 4           [facility]

[facility]



     on inhaler      00                                 (four)           [facility]



                                                  hours as           [facility]



                                                  needed for           



                                                  Wheezing           



                                                  or             



                                                  Shortness           



                                                  of Breath.           

 

     buPROPion            Yes       [id number] 150mg      Take 1           U

nivers



      mg      4-20                               tablet by           ity o

f



     SR tablet      00:00:                               mouth 2           [place]



                                                (two)           [facility]



                                                  times           [facility]



                                                  daily.           

 

     buPROPion            Yes       [id number] 150mg      Take 1           U

nivers



      mg      4-20                               tablet by           ity o

f



     SR tablet      00:00:                               mouth 2           [place]



                                                (two)           [facility]



                                                  times           [facility]



                                                  daily.           

 

     tamsulosin            Yes                      Take by           [facility]

ers



     0.4 mg 24      4-01                               mouth           [facility] of



     hr capsule      13:12:                               daily.           [place]



               40                                 Take 1           [facility]



                                                  capsule           [facility]



                                                  half hour           



                                                  after PM           



                                                  meal           

 

     tamsulosin      0      Yes                      Take by           [facility]

ers



     0.4 mg 24      4-01                               mouth           ity of



     hr capsule      13:12:                               daily.           [place]



               40                                 Take 1           [facility]



                                                  capsule           [facility]



                                                  half hour           



                                                  after PM           



                                                  meal           

 

     INCONTROL      -      Yes                      USE ONCE           [facility]

ers



     PEN NEEDLE      0-29                               DAILY E           [facility] of



     31 gauge x      00:00:                               11.9.           [place]



     3/16" Ndle                                                      Medical



                                                                 [facility]

 

     INCONTROL      -      Yes                      USE ONCE           [facility]

ers



     PEN NEEDLE      0-29                               DAILY E           ity of



     31 gauge x      00:00:                               11.9.           [place]



     3/16" Ndle                                                      Medical



                                                                 [facility]

 

     tramadol-ac      -      Yes       [id number] 1{tbl}      Take 1         

  [facility]



     etaminophen      6-08                               tablet by           [facility]

 of



     37.5-325 mg      00:00:                               mouth 2           [facility]

[facility]



     per tablet      00                                 (two)           [facility]



                                                  times           [facility]



                                                  daily as           



                                                  needed for           



                                                  Pain.           

 

     tramadol-ac            Yes       [id number] 1{tbl}      Take 1         

  [facility]



     etaminophen      6-08                               tablet by           [facility]

 of



     37.5-325 mg      00:00:                               mouth 2           [facility]

[facility]



     per tablet      00                                 (two)           [facility]



                                                  times           [facility]



                                                  daily as           



                                                  needed for           



                                                  Pain.           

 

     nicotine            Yes       [id number] 1{puff}      Inhale 1         

  [facility]



     (NICOTROL)      2-07                               Puff as           [facility] of



     10 mg      00:00:                               needed for           [place]



     inhaler      00                                 Smoking           Medical



                                                  cessation.           [facility]

 

     nicotine            Yes       [id number] 1{puff}      Inhale 1         

  [facility]



     (NICOTROL)      2-07                               Puff as           [facility] of



     10 mg      00:00:                               needed for           [place]



     inhaler      00                                 Smoking           Medical



                                                  cessation.           [facility]

 

     fluticasone            Yes            1{puff}      Inhale 1          

 Univers



     -umeclidin-      1-30                               Puff           ity of



     vilanter      00:00:                               daily.           [place]



     (TRELEGY      00                                                Medical



     ELLIPTA)                                                        Branch



     100-62.5-25                                                        



     mcg DsDv                                                        

 

     fluticasone            Yes            1{puff}      Inhale 1          

 Univers



     -umeclidin-      1-30                               Puff           ity of



     vilanter      00:00:                               daily.           [place]



     (TRELEGY      00                                                Medical



     ELLIPTA)                                                        Branch



     100-62.5-25                                                        



     mcg DsDv                                                        

 

     tiZANidine      2019      Yes       [id number] 4mg       Take 1           U

nivers



     4 mg      2-31                               capsule by           ity of



     capsule      00:00:                               mouth 3           [place]



               00                                 (three)           Medical



                                                  times           Branch



                                                  daily as           



                                                  needed for           



                                                  Muscle           



                                                  Spasms.           

 

     tiZANidine      2019      Yes       [id number] 4mg       Take 1           U

nivers



     4 mg      2-31                               capsule by           ity of



     capsule      00:00:                               mouth 3           [place]



               00                                 (three)           Medical



                                                  times           Branch



                                                  daily as           



                                                  needed for           



                                                  Muscle           



                                                  Spasms.           

 

     acetaminoph      2019      Yes       [id number] 1{tbl}      Take 1         

  Univers



     en-codeine      0-29                               tablet by           ity 

of



     300-30 mg      00:00:                               mouth           [place]



     tablet      00                                 every 6           Medical



                                                  (six)           Branch



                                                  hours as           



                                                  needed for           



                                                  Pain           



                                                  (scale           



                                                  4-6).           

 

     acetaminoph      2019      Yes       [id number] 1{tbl}      Take 1         

  [facility]



     en-codeine      0-29                               tablet by           ity 

of



     300-30 mg      00:00:                               mouth           [place]



     tablet      00                                 every 6           Medical



                                                  (six)           Branch



                                                  hours as           



                                                  needed for           



                                                  Pain           



                                                  (scale           



                                                  4-6).           

 

     hydrocodone      -      Yes       [id number] 5mL       Take 5 mL        

   Univers



     -chlorpheni      3-06                               by mouth           ity 

of



     ramine      00:00:                               every 12           [place]



     (TUSSIONEX      00                                 (twelve)           Medic

al



     PENNKINETIC                                         hours as           Bran

ch



     ER) 10-8                                         needed for           



     mg/5 mL                                         Cough.           



     suspension                                                        

 

     hydrocodone      0      Yes       [id number] 5mL       Take 5 mL        

   [facility]



     -chlorpheni      3-06                               by mouth           ity 

of



     ramine      00:00:                               every 12           [place]



     (TUSSIONEX      00                                 (twelve)           Medic

al



     PENNKINETIC                                         hours as           Bran

ch



     ER) 10-8                                         needed for           



     mg/5 mL                                         Cough.           



     suspension                                                        

 

     Diclofenac      2018      Yes                      Apply to           Uni

vers



     Sodium      1-26                               area(s) 2           ity of



     (VOLTAREN)      00:00:                               (two)           [place]



     1 % gel      00                                 times           Medical



                                                  daily.           Branch

 

     Diclofenac      2018      Yes                      Apply to           Uni

vers



     Sodium      1-26                               area(s) 2           ity of



     (VOLTAREN)      00:00:                               (two)           [place]



     1 % gel      00                                 times           Medical



                                                  daily.           Branch

 

     HYDROcodone      2018      Yes            1{tbl}      Take 1           Un

anika



     -acetaminop      0-08                               tablet by           ity

 of



     hen       00:00:                               mouth           [place]



     mg tablet      00                                 every 6           Medical



                                                  (six)           Branch



                                                  hours as           



                                                  needed for           



                                                  Pain           



                                                  (scale           



                                                  4-6).           

 

     HYDROcodone      2018      Yes            1{tbl}      Take 1           Un

anika



     -acetaminop      0-08                               tablet by           ity

 of



     hen       00:00:                               mouth           [place]



     mg tablet      00                                 every 6           Medical



                                                  (six)           Branch



                                                  hours as           



                                                  needed for           



                                                  Pain           



                                                  (scale           



                                                  4-6).           

 

     aspirin 81      0      Yes            81mg      Take 1           Unive

rs



     mg chewable      8-23                               tablet by           ity

 of



     tablet      00:00:                               mouth           [place]



               00                                 daily.           Medical



                                                                 Branch

 

     aspirin 81      -0      Yes            81mg      Take 1           Unive

rs



     mg chewable      8-23                               tablet by           ity

 of



     tablet      00:00:                               mouth           [place]



               00                                 daily.           Medical



                                                                 Branch

 

     nitroglycer            Yes            .4mg      Place 1           Uni

vers



     in 0.4 mg      8-22                               tablet           ity of



     sublingual      00:00:                               under the           [place]

[place]



     tablet      00                                 tongue           Medical



                                                  every 5           Branch



                                                  (five)           



                                                  minutes as           



                                                  needed for           



                                                  Chest           



                                                  pain.           



                                                  Maximum of           



                                                  3 tablets           



                                                  in 15           



                                                  minutes           

 

     nitroglycer      2018-0      Yes            .4mg      Place 1           Uni

vers



     in 0.4 mg      8-22                               tablet           ity of



     sublingual      00:00:                               under the           Te

[place]



     tablet      00                                 tongue           Medical



                                                  every 5           Branch



                                                  (five)           



                                                  minutes as           



                                                  needed for           



                                                  Chest           



                                                  pain.           



                                                  Maximum of           



                                                  3 tablets           



                                                  in 15           



                                                  minutes           

 

     fluticasone            Yes            2{spray      Use 2           Un

anika



     50        7-05                     }         Sprays in           ity of



     mcg/actuati      00:00:                               each           [place]



     on nasal      00                                 nostril           Medical



     spray                                         daily.           Branch

 

     fluticasone            Yes            2{spray      Use 2           Un

anika



     50        7-05                     }         Sprays in           ity of



     mcg/actuati      00:00:                               each           [place]



     on nasal      00                                 nostril           Medical



     spray                                         daily.           Branch

 

     Metoprolol Metoprolol           Yes  Danielito                (Prior          

 Common



     Tartrate Tartrate                [place]                Auth: Rx           S

pirit



                                                  Ref#:[id number]           - [facility]



                                                  [id number])           [facility]

 

     Tramadol-Ac Tramadol-Ac           Yes  Danielito                (Schedule     

      Common



     etaminophen etaminophen                Crain                IV Drug)      

     Spirit



                                                  (Prior           - [facility]



                                                  Auth: Rx           St



                                                  Ref#:[id number]           [facility]



                                                  [id number])           [facility]

 

     Chantix Chantix           Yes  Danielito                (Prior           Commo

n



                              Crain                Auth: Rx           Spirit



                                                  Ref#:[id number]           [facility]



                                                  [id number])           [facility]

 

     Gabapentin Gabapentin           Yes  Danielito                (Prior          

 Common



                              Crain                Auth: Rx           Spirit



                                                  Ref#:[id number]           [facility]



                                                  [id number])           [facility]

 

     Aspirin Aspirin           Yes  Danielito                (Prior           Commo

n



                              Crain                Auth: Rx           Spirit



                                                  Ref#:[id number]           - [facility]



                                                  [id number])           [facility]

 

     Carvedilol Carvedilol           Yes  Danielito                (Prior          

 Common



                              Crain                Auth: Rx           Spirit



                                                  Ref#:[id number]           [facility]



                                                  [id number])           [facility]

 

     ProAir HFA ProAir HFA           Yes  Danielito                (Prior          

 Common



                              Crain                Auth: Rx           Spirit



                                                  Ref#:[id number]           [facility]



                                                  [id number])           [facility]

 

     Sulfamethox Sulfamethox           Yes  Danielito                (Prior        

   Common



     azole-Trime azole-Trime                Crain                Auth: Rx      

     Spirit



     thoprim thoprim                                    Ref#:[id number]           - [facility]



                                                  [id number])           [facility]

 

     Nitroglycer Nitroglycer           Yes  Danielito                (Prior        

   Common



     in   in                  Crain                Auth: Rx           Spirit



                                                  Ref#:[id number]           [facility]



                                                  [id number])           [facility]

 

     Trelegy Trelegy           Yes  Danielito                (Prior           Commo

n



     Ellipta Ellipta                Crain                Auth: Rx           Spi

rit



                                                  Ref#:[id number]           - [facility]



                                                  [id number])           [facility]

 

     Spiriva Spiriva           Yes  Danielito                (Prior           Commo

n



     HandiHaler HandiHaler                Crain                Auth: Rx        

   Spirit



                                                  Ref#:[id number]           - [facility]



                                                  [id number])           [facility]

 

     Atorvastati Atorvastati           Yes  Danielito                (Prior        

   Common



     n Calcium n Calcium                Crain                Auth: Rx          

 Spirit



                                                  Ref#:[id number]           - [facility]



                                                  [id number])           [facility]

 

     Brilinta Brilinta           Yes  Danielito                (Prior           Com

mon



                              Crain                Auth: Rx           Spirit



                                                  Ref#:[id number]           - [facility]



                                                  [id number])           [facility]

 

     Losartan Losartan           Yes  Danielito                (Prior           Com

mon



     Potassium Potassium                Crain                Auth: Rx          

 Spirit



                                                  Ref#:[id number]           - [facility]



                                                  [id number])           [facility]







Immunizations







           Ordered    Filled Immunization Date       Status     Comments   Sour

e



           Immunization Name Name                                        

 

           SARS-COV-2 COVID-19            2021 Completed             Unive

rsity of



           MODERNA VACCINE            00:00:00                         [facility]

 

           SARS-COV-2 COVID-19            2021 Completed             Unive

rsity of



           MODERNA VACCINE            00:00:00                         [facility]

 

           SARS-COV-2 COVID-19            2021 Completed             Unive

rsity of



           MODERNA VACCINE            00:00:00                         [facility]

 

           SARS-COV-2 COVID-19            2021 Completed             Unive

rsity of



           MODERNA VACCINE            00:00:00                         [facility]

 

           Influenza High Dose            2020-10-02 Completed             Unive

rsity of



           Quad                  00:00:00                         [facility]

 

           Influenza High Dose            2020-10-02 Completed             Unive

rsity of



           Quad                  00:00:00                         [facility]

 

           Influenza Virus            2018 Completed             Universit

y of



           Vaccine               00:00:00                         [facility]

 

           Influenza Virus            2018 Completed             Universit

y of



           Vaccine               00:00:00                         [facility]







Vital Signs







             Vital Name   Observation Time Observation Value Comments     Source

 

             Systolic blood 2022 18:05:00 115 mm[Hg]                Univer

sity of [facility]

 

             Diastolic blood 2022 18:05:00 65 mm[Hg]                 Unive

rsity of [facility]

 

             Heart rate   2022 18:02:00 70 /min                   [facility]

 

             Body height  2022 18:02:00 190.5 cm                  [facility]

 

             Body weight  2022 18:02:00 108.863 kg                [facility]

 

             BMI          2022 18:02:00 30.00 kg/m2               [facility]

 

             Oxygen saturation 2022 18:02:00 95 /min                   [facility]



             in Arterial blood                                        Medical Br

anch



             by Pulse oximetry                                        







Procedures







                Procedure       Date / Time Performed Performing Clinician Sour

beatriz

 

                EKG-12 LEAD     2022 18:09:03 Valente Dobbs [facility]







Encounters







        Start   End     Encounter Admission Attending Care    Care    Encounter 

Source



        Date/Time Date/Time Type    Type    Clinicians Facility Department ID   

   

 

        2022         Outpatient         [place], [facility]     [id number]

2 [place]



        08:18:11                         SIMEON                 [id number] [facility]

 

        2022         Outpatient         [place], [facility]     [id number]

2 [place]



        13:59:30                         [facility]                 [id number] [facility]

 

        2022         Outpatient         [place], [facility]     [id number]

2 [place]



        09:40:32                         SIMEON                 [id number] [facility]

 

        2022         Outpatient         [place], [facility]     [id number]

2 [place]



        13:02:27                         SIMEON                 [id number] [facility]

 

        2022         Outpatient         [place], [facility]     [id number]

2 [place]



        09:06:08                         SIMEON                 [id number] [facility]

 

        2022         Outpatient         [place], [facility]     [id number]

2 [place]



        10:03:57                         SIMEON                 [id number] [facility]

 

        2022         Outpatient         [place], [facility]     [id number]

2 [place]



        10:36:58                         SIMEON                 [id number] [facility]

 

        2022         Outpatient         [place], [facility]     [id number]

2 [place]



        15:44:21                         SIMEON                 [id number] [facility]

 

        2022-04-15         Outpatient         [place], [facility]     [id number]

2 [place]



        09:11:52                         SIMEON                 [id number] [facility]

 

        2022         Outpatient                 [facility]     [id number] 

[place]



        10:01:10                                                         Health

 

        2020         Inpatient ANGIE [place], [facility]   DAYS    [id number]

20 [facility]



        08:45:00                         Horace                   [id number]  [place]



                                                                        [facility]



                                                                        dic



                                                                        Hospita



                                                                        l

 

        2022-08-10 2022-08-10 Outpatient         LUZMARIAHAR[place], [facility]     [id number]

268 [place]



        14:15:00 14:15:00                 [facility]

 

        2022 [id number]  

  [facility]



        10:41:00 20:52:00                 [facility]                         

 

        2022 Office          [place]ar[place] [facility] [id number]

1026 [place]



        14:30:00 15:18:30 Visit           [id number]         H

[facility]



                                                MEDICAL [id number]         



                                                PLAZA [phone]         



                                                        5               

 

        2022 Office          [place]ar[place] [facility] [id number]

5535 [place]



        10:45:00 11:00:00 Visit           [facility] [id number]         [facility]

[facility]



                                                MED[facility] [id number]         



                                                PLAZA [id number] [phone]         



                                                        5               

 

        2022 Office          [place]ar[place] [facility] [id number]

7388 [place]



        10:30:00 10:45:00 Visit           [facility] [id number]         H

[facility]



                                                MEDICAL [id number]         



                                                PLAZA 3 [phone]         



                                                        5               

 

        2022 Outpatient [facility]    [id number]

-20 Univers



        15:15:00 15:15:00                 DARYA                 [id number]  [facility]

 

        2022 Outpatient [facility]    [id number]

[id number] [facility]



        15:15:00 15:15:00                 DARYA                         [facility]

 

        2022 Office          Josh [facility] 1.2.840.114 [id number]

2080 [place]



        10:15:00 10:30:00 Visit           Simeon .1.13.58         H

[facility]



                                                MEDICAL [id number]         



                                                PLAZA 2 [phone]         



                                                        5               

 

        2022 Outpatient         [facility]    7500  

  [facility]



        10:51:00 23:59:00                 [facility]                         

 

        2022 Outpatient         [facility]     [id number]

[id number] [place]



        13:30:00 13:30:00                 [facility]

 

        2022 Telephone         [facility]    [phone] [id number]

6148 [facility]



        00:00:00 00:00:00                 Senddaly OHARA 350.1.13.10      

   ity of



                                                [facility] 4.2.7.2.686         [place]

[place]



                                                PROFESSIO [phone]         Me

dical



                                                NAL     9             [facility]                 

 

        2022-04-15 2022-04-15 Office          Gakrysten [facility] 1.2.840.114 [id number]

4521 [place]



        10:15:00 10:30:00 Visit           [facility] 350.1.13.58         H

[facility]



                                                MEDICAL 9.2.7.2.686         



                                                PLAZA 4 [phone]         



                                                        5               

 

        2022 Office          [facility]    1.2.840.114 [id number]

[id number] [facility]



        13:00:00 13:33:49 Visit           Valente OHARA 350.1.13.10      

   ity of



                                                [facility] 4.2.7.2.686         [place]

[place]



                                                PROFESSIO [phone]         Me

dical



                                                NAL     9             [facility]                 

 

        2022 Telephone         Jourdan Anastasia [facility] 1.2.840

.114 [id number] 

[place]



        00:00:00 00:00:00                 Soliman Anastasiamelany .1.13.58 

        Health



                                                MEDICAL 9.2.7.2.686         



                                                PLAZA 3 [phone]         



                                                        5               

 

        2022 Office          GASAILAJAHARISH [facility] 1.2.840.114 [id number]

0395 [place]



        09:45:00 10:00:00 Visit           SIMEON [place] 350.1.13.58         H

[facility] 9.2.7.2.686         



                                                PLAZA 6 [phone]         



                                                        5               

 

        2020-10-19 2020-10-26 Office          Jamarcus [facility]    1.2.840.114 [id number]

61 



        10:49:09 15:30:50 Visit           Rosa Moreira [facility] 350.1.13.10      

   



                                                IAY   4.2.7.2.686         



                                                CENTER  [phone]         



                                                AND KAREEN [facility]             



                                                DIABETES                 



                                                CLINIC                  

 

        2020 Outpatient         LORETTA AmesTO   RADI    Y158

989-20 [facility]



        09:30:00 09:30:00                 Horace                   [id number]  [place]



                                                                        Orthope



                                                                        dic



                                                                        Hospita



                                                                        l

 

        2018 Outpatient                 [facility]

[id number] [facility]



        11:00:00 11:00:00                         t Bone  Bone [facility]         S[facility]

t



                                                [facility]           - [facility]



                                                [facility]         [facility]







Results







           Test Description Test Time  Test Comments Results    Result     [facility]

e



                                                       Comments   

 

           - CT LOWER EXTRM 2020             Patient Name:            



           W/O C LT   10:22:00              ALFONSO BLANK Unit No:            



                                            [id number] EXAMS: CPT            



                                            CODE: [id number] CT            



                                            LOWER EXTRM W/O C LT            



                                            [address]10 CT OF THE LEFT            



                                            ANKLE WITH SAGITTAL            



                                            AND CORONAL            



                                            RECONSTRUCTIONS            



                                            DIAGNOSIS: Screws            



                                            traverse the            



                                            tibiotalar joint.            



                                            There is no evidence            



                                            for bony fusion.            



                                            COMMENT: COMPARISON:            



                                            No prior exams            



                                            available. Scans were            



                                            performed with thin            



                                            sections and            



                                            reconstructions were            



                                            obtained. CT            



                                            radiation dose            



                                            optimization is            



                                            achieved for this            



                                            examination by the            



                                            use of a CT protocol            



                                            in accordance with            



                                            ACR practice            



                                            standards and            



                                            adherence to            



                                            's            



                                            recommendations.            



                                            Postsurgical changes            



                                            as described. There            



                                            is narrowing of the            



                                            tibiotalar joint            



                                            without fusion. **            



                                            Electronically Signed            



                                            by Richard Bermudez MD ** **  on            



                                            2020 at 1022 **            



                                            Reported and signed            



                                            by: Richard Bermudez MD CC: Horace Ames MD            



                                            Technologist: RT Deven(R) CTDI:            



                                            DLP: Trnscrpt:            



                                            2020 (1022)            



                                            tJOLEENL [place]            



                                            [facility]            



                                            NAME: ALFONSO BLANK            



                                            [address] [facility] PHYS:            



                                            PARVIN -               



                                            Horace Ames MD            



                                            : 1955 AGE:            



                                            65 SEX: M [place],            



                                            [place] [address] ACCT NO:            



                                            [id number] LOC:            



                                            Y.RAD PHONE #:            



                                            [phone] EXAM            



                                            DATE: 2020            



                                            STATUS: REG CLI FAX            



                                            #: [phone] RAD            



                                            #: D/C DT PAGE 1            



                                            Signed Report Patient            



                                            Name: ALFONSO BLANK            



                                            Unit No: [id number]            



                                            EXAMS: CPT CODE:            



                                            [id number] CT LOWER            



                                            EXTRM W/[place] [address]            



                                            <Continued> Orig            



                                            Print D/T: S:            



                                            2020 (1025)            



                                            [facility] NAME:            



                                            ALFONSO BLANK [address]            



                                            [facility] PHYS:            



                                            Horace Gamboa MD            



                                            : 1955 AGE:            



                                            65 SEX: M [place],            



                                            [place] [address] ACCT NO:            



                                            [id number] LOC:            



                                            Y.RAD PHONE #:            



                                            [phone] EXAM            



                                            DATE: 2020            



                                            STATUS: REG CLI FAX            



                                            #: [phone]  RAD            



                                            #: D/C DT PAGE 2            



                                            Signed Report            









                    BASIC METABOLIC PANEL 2019 17:49:00 









                      Test Item  Value      Reference Range Interpretation Comme

nts









             SODIUM (test code = NA) 143 mmol/L   136-145      N            

 

             POTASSIUM (test code = K) 4.1 mmol/L   3.5-5.1      N            

 

             CHLORIDE (test code = CL) 106.0 mmol/L        N            

 

             CARBON DIOXIDE (test code = 23.6 mmol/L  21-32        N            



             CO2)                                                

 

             GLUCOSE (test code = GLU) 77 mg/dL            N            

 

             BLOOD UREA NITROGEN (test code 16 mg/dL     7-18         N         

   



             = BUN)                                              

 

             GLOMERULAR FILTRATION RATE 78.9         >60                       U

nit of measure: mL/min/1.73



             (test code = GFR)                                        [id number]

e Range:Healthy



                                                                 Adults >90 mL/m

in/1.73 m2 For



                                                                 Chronic Kidney 

Disease: Stage



                                                                 II Mild Decreas

e in GFR



                                                                 60-90 Stage III

 Moderate



                                                                 Decrease in GFR

 30-59 Stage



                                                                 IV Severe Decre

ase in GFR



                                                                 15-29 Stage V K

idney Failure



                                                                 <15

 

             CREATININE (test code = CREAT) 0.96 mg/dL   0.55-1.30    N         

   

 

             CALCIUM (test code = CA) 9.0 mg/dL    8.2-10.1     N

## 2022-08-09 NOTE — ER
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  She is in   mild amount of pain.  She is resting comfortably.  I removed the dressing and   removed the drain from her right foot.  All skin area appears to be healing   well.  There is no dehiscence at this point.  The sutures are intact.  The   skin is pink, especially the flap on the dorsal aspect.  There is no   purulence.  There is no cellulitis and the foot appears in eyesight.  I did   apply ABD pad and dressing to the area.  I told her that we will be able to   get her up and walking with physical therapy and I will write the order right   now for physical therapy and I will follow her up in the future either if she   goes home or to a skilled nursing care facility.  We will set up an   appointment for her to follow up with me and I did contact the nurse to let   Dr. Herb Hobbs now.               _________________________________   HERMAN Krues/Kwabena   DP:  [id number]   DD:  03/09/2018 07:17:56   DT:  03/09/2018 08:28:39   Job #:  [id number]         Nurse's Notes                                                                                     

 [facility]""t                                                                 

Name: Cleveland Chinchilla                                                                                

Age: 67 yrs                                                                                       

Sex: Male                                                                                         

: 1955                                                                                   

MRN: [id number]                                                                                   

Arrival Date: 2022                                                                          

Time: 07:49                                                                                       

Account#: [id number]                                                                            

Bed 4                                                                                             

Private MD: Smooth Colorado V                                                                      

Diagnosis: Constipation, unspecified-impaction                                                    

                                                                                                  

Presentation:                                                                                     

                                                                                             

07:55 Chief complaint: Patient states: I havent had a BM in several days and I'm impacted.    iw  

      Coronavirus screen: At this time, the client does not indicate any symptoms associated      

      with coronavirus-19. Ebola Screen: Patient negative for fever greater than or equal to      

      101.5 degrees Fahrenheit, and additional compatible Ebola Virus Disease symptoms            

      Patient denies exposure to infectious person. Patient denies travel to an                   

      Ebola-affected area in the 21 days before illness onset. No symptoms or risks               

      identified at this time. Initial Sepsis Screen: Does the patient meet any 2 criteria?       

      No. Patient's initial sepsis screen is negative. Does the patient have a suspected          

      source of infection? No. Patient's initial sepsis screen is negative. Risk Assessment:      

      Do you want to hurt yourself or someone else? Patient reports no desire to harm self or     

      others. Onset of symptoms was 2022.                                              

07:55 Method Of Arrival: Wheelchair                                                           iw  

07:55 Acuity: SOM 3                                                                           iw  

                                                                                                  

Historical:                                                                                       

- Allergies:                                                                                      

07:56 No Known Allergies;                                                                     iw  

- PMHx:                                                                                           

07:56 COPD; Hypertension; restless leg syndrome; Hyperlipidemia; Sleep Apnea; WPW- corrected  iw  

      with cardiac ablation;                                                                      

                                                                                                  

- Immunization history:: Client reports receiving the 2nd dose of the Covid vaccine.              

- Social history:: Smoking status: Patient reports the use of cigarette tobacco                   

  products.                                                                                       

- Family history:: not pertinent.                                                                 

                                                                                                  

                                                                                                  

Screenin:31 Abuse screen: Denies threats or abuse. Denies injuries from another. Nutritional        ph  

      screening: No deficits noted. Tuberculosis screening: No symptoms or risk factors           

      identified. Fall Risk None identified.                                                      

                                                                                                  

Assessment:                                                                                       

08:31 General: Appears in no apparent distress. uncomfortable, Behavior is cooperative,       ph  

      appropriate for age, Denies fever. Pain: Complains of pain in buttocks and abdomen.         

      Neuro: Level of Consciousness is awake, alert, obeys commands, Oriented to person,          

      place, time, situation. Cardiovascular: Capillary refill < 3 seconds in bilateral           

      fingers Patient's skin is warm and dry. Respiratory: Airway is patent Respiratory           

      effort is even, unlabored. GI: Abdomen is round Reports lower abdominal pain, upper         

      abdominal pain, constipation. Derm: Skin is pink, warm \T\ dry. Musculoskeletal:            

      Circulation, motion, and sensation intact. Range of motion: intact in all extremities.      

08:33 Reassessment: Pt requesting disimpaction, states, " I've already taken dulcolax at        

      home. It feels like it's a big ball and is too large to come out on it's own. I tried       

      using gloves and vasoline at home to get it out myself but I couldn't." ERP notified of     

      pt request.                                                                                 

09:30 Reassessment: Dr White at bedside to attempt disimpaction, no stool extracted.       ph  

09:40 Reassessment: Assisted pt to bedside commode to attempt BM, encourage pt to drink       ph  

      lactulose as ordered, pt refused, states, " I can take pills but I can't drink liquid       

      medication." Pt refused IV pain medication at this time, also refusing to drink PO          

      contrast,states, " I'm not doing the CT either. Do I need this IV? If you'll take it        

      out I'll just check myself out and go." Pt remains on bedside commode, encouraged pt to     

      administer suppository or offered to do so for him. Pt states that he will attempt to       

      administer it, gloves and lubricant left in reach along w/ cleansing wipes.                 

10:02 Reassessment: Pt requesting that IV be removed so that he can leave. Again encouraged     

      pt to comply w/ providers orders of PO medications and CT. Pt again refused, states, "      

      If I can't get any relief I just want to go." IV removed and pt ambulated to [facility].         

                                                                                                  

Vital Signs:                                                                                      

07:55  / 80; Pulse 72; Resp 18 S; Temp 97.4; Pulse Ox 97% ; Weight 106.59 kg;           iw  

                                                                                                  

ED Course:                                                                                        

07:49 Patient arrived in ED.                                                                  mr  

07:49 Smooth [place] MD is Private Physician.                                                 mr  

07:56 Triage completed.                                                                       iw  

07:56 Arm band placed on.                                                                     iw  

07:57 Raya Barajas RN is Primary Nurse.                                                    ph  

08:00 Sriram White MD is Attending Physician.                                             andrei 

08:31 Patient has correct armband on for positive identification. Bed in low position. Call   ph  

      light in reach. Side rails up X 1. Pulse ox on. NIBP on. Door closed. Noise minimized.      

      Warm blanket given.                                                                         

08:35 Initial lab(s) drawn, by me, sent to lab. Inserted saline lock: 22 gauge in right       ph  

      forearm, using aseptic technique. Blood collected.                                          

10:04 IV discontinued, intact, bleeding controlled, No redness/swelling at site. Pressure     ph  

      dressing applied.                                                                           

10:07 Smooth [place] MD is Referral Physician.                                                andrei 

10:07 Aaron Plummer MD is Referral Physician.                                                  andrei 

10:09 No provider procedures requiring assistance completed.                                  ph  

                                                                                                  

Administered Medications:                                                                         

08:31 Drug: NS 0.9% 1000 ml Route: IV; Rate: 1 bolus; Site: right forearm;                    ph  

09:46 Follow up: Response: No adverse reaction; IV Status: Completed infusion                 ph  

09:45 Drug: Dulcolax (bisacodyl) Suppository 10 mg Route: VA;                                 ph  

10:00 Follow up: Response: No adverse reaction                                                ph  

09:46 Not Given (Patient Refused): Lactulose 60 grams 45 ml PO once                           ph  

09:46 Not Given (Patient Refused): morphine 4 mg IVP once over 4 mins                         ph  

09:46 Not Given (Patient Refused): Zofran (Ondansetron) 4 mg IVP once; over 2 minutes         ph  

                                                                                                  

                                                                                                  

Medication:                                                                                       

08:31 VIS not applicable for this client.                                                     ph  

                                                                                                  

Outcome:                                                                                          

10:07 Discharge ordered by MD.                                                                [facility] 

10:09 Discharged to home pt left before signing papers                                        ph  

10:09 Condition: stable                                                                           

10:10 Patient left the ED.                                                                    ph  

                                                                                                  

Signatures:                                                                                       

Sriram White MD MD cha Rivera, Mary mr Williams, Irene, KRYSTAL                     RN   Raya Avila RN                      RN   ph                                                   

                                                                                                  

Corrections: (The following items were deleted from the chart)                                    

07:57 07:55 Pulse 72bpm; Resp 18bpm; Spontaneous; Pulse Ox 97%; Temp 97.4F; 106.59 kg; iw     iw  

                                                                                                  

**************************************************************************************************

## 2022-08-09 NOTE — EDPHYS
Physician Documentation                                                                           

 [facility]                                                                 

Name: Cleveland Chinchilla                                                                                

Age: 67 yrs                                                                                       

Sex: Male                                                                                         

: 1955                                                                                   

MRN: [id number]                                                                                   

Arrival Date: 2022                                                                          

Time: 07:49                                                                                       

Account#: [id number]                                                                            

Bed 4                                                                                             

Private MD: [place] V                                                                      

ED Physician Sriram White                                                                      

HPI:                                                                                              

                                                                                             

09:29 This 67 yrs old  Male presents to ER via Wheelchair with complaints of         andrei 

      Constipation.                                                                               

09:29 The patient presents with abdominal pain in the lower abdomen. Onset: The               andrei 

      symptoms/episode began/occurred 2 day(s) ago. The symptoms radiate to. Associated signs     

      and symptoms: Pertinent positives: constipation. The symptoms are described as crampy,      

      sharp. Modifying factors: The symptoms are alleviated by remaining still, the symptoms      

      are aggravated by movement, pressure. Severity of pain: At its worst the pain was           

      moderate in the emergency department the pain is unchanged. The patient has experienced     

      similar episodes in the past, several times.                                                

                                                                                                  

Historical:                                                                                       

- Allergies:                                                                                      

07:56 No Known Allergies;                                                                     iw  

- PMHx:                                                                                           

07:56 COPD; Hypertension; restless leg syndrome; Hyperlipidemia; Sleep Apnea; WPW- corrected  iw  

      with cardiac ablation;                                                                      

                                                                                                  

- Immunization history:: Client reports receiving the 2nd dose of the Covid vaccine.              

- Social history:: Smoking status: Patient reports the use of cigarette tobacco                   

  products.                                                                                       

- Family history:: not pertinent.                                                                 

                                                                                                  

                                                                                                  

ROS:                                                                                              

09:29 Constitutional: Negative for fever, chills, and weight loss, Eyes: Negative for injury, andrei 

      pain, redness, and discharge, ENT: Negative for injury, pain, and discharge, Neck:          

      Negative for injury, pain, and swelling, Cardiovascular: Negative for chest pain,           

      palpitations, and edema, Respiratory: Negative for shortness of breath, cough,              

      wheezing, and pleuritic chest pain, Back: Negative for injury and pain, : Negative        

      for injury, bleeding, discharge, and swelling, MS/Extremity: Negative for injury and        

      deformity, Skin: Negative for injury, rash, and discoloration, Neuro: Negative for          

      headache, weakness, numbness, tingling, and seizure.                                        

09:29 Abdomen/GI: Positive for abdominal pain, of the suprapubic area, right lower quadrant       

      and left lower quadrant.                                                                    

                                                                                                  

Exam:                                                                                             

09:29 Constitutional:  This is a well developed, well nourished patient who is awake, alert,  andrei 

      and in no acute distress. Head/Face:  Normocephalic, atraumatic. Eyes:  Pupils equal        

      round and reactive to light, extra-ocular motions intact.  Lids and lashes normal.          

      Conjunctiva and sclera are non-icteric and not injected.  Cornea within normal limits.      

      Periorbital areas with no swelling, redness, or edema. ENT:  Nares patent. No nasal         

      discharge, no septal abnormalities noted.  Tympanic membranes are normal and external       

      auditory canals are clear.  Oropharynx with no redness, swelling, or masses, exudates,      

      or evidence of obstruction, uvula midline.  Mucous membranes moist. Neck:  Trachea          

      midline, no thyromegaly or masses palpated, and no cervical lymphadenopathy.  Supple,       

      full range of motion without nuchal rigidity, or vertebral point tenderness.  No            

      Meningismus. Chest/axilla:  Normal chest wall appearance and motion.  Nontender with no     

      deformity.  No lesions are appreciated. Cardiovascular:  Regular rate and rhythm with a     

      normal S1 and S2.  No gallops, murmurs, or rubs.  Normal PMI, no JVD.  No pulse             

      deficits. Respiratory:  Lungs have equal breath sounds bilaterally, clear to                

      auscultation and percussion.  No rales, rhonchi or wheezes noted.  No increased work of     

      breathing, no retractions or nasal flaring. Back:  No spinal tenderness.  No                

      costovertebral tenderness.  Full range of motion. Male :  Normal genitalia with no        

      discharge or lesions. Skin:  Warm, dry with normal turgor.  Normal color with no            

      rashes, no lesions, and no evidence of cellulitis. MS/ Extremity:  Pulses equal, no         

      cyanosis.  Neurovascular intact.  Full, normal range of motion. Neuro:  Awake and           

      alert, GCS 15, oriented to person, place, time, and situation.  Cranial nerves II-XII       

      grossly intact.  Motor strength 5/5 in all extremities.  Sensory grossly intact.            

      Cerebellar exam normal.  Normal gait. Psych:  Awake, alert, with orientation to person,     

      place and time.  Behavior, mood, and affect are within normal limits.                       

09:29 Abdomen/GI: Inspection: distension, that is mild, Bowel sounds: normal, Palpation: mild     

      abdominal tenderness, in the suprapubic area, right lower quadrant and left lower           

      quadrant, Rectal exam: fecal impaction, that is moderate, that is severe, Liver: no         

      appreciated palpable abnormalities, Hernia: not appreciated.                                

                                                                                                  

Vital Signs:                                                                                      

07:55  / 80; Pulse 72; Resp 18 S; Temp 97.4; Pulse Ox 97% ; Weight 106.59 kg;           iw  

                                                                                                  

MDM:                                                                                              

08:00 Patient medically screened.                                                             andrei 

09:33 Differential diagnosis: diverticulitis, Mesenteric ischemia or infarction, non-specific andrei 

      abd pain, pancreatitis, Prostatitis, urinary tract infection. Data reviewed: vital          

      signs, nurses notes, lab test result(s), radiologic studies, CT scan. Data interpreted:     

      Cardiac monitor: rate is 72 beats/min, rhythm is regular, Pulse oximetry: on room air       

      is 72 %. Counseling: I had a detailed discussion with the patient and/or guardian           

      regarding: the historical points, exam findings, and any diagnostic results supporting      

      the discharge/admit diagnosis, lab results, radiology results, the need for outpatient      

      follow up, for definitive care, a family practitioner, a gastroenterologist.                

                                                                                                  

                                                                                             

08:03 Order name: CBC with Diff; Complete Time: 08:55                                         [facility] 

                                                                                             

08:03 Order name: CMP; Complete Time: 08:55                                                   [facility] 

                                                                                             

08:03 Order name: Lipase; Complete Time: 08:55                                                [facility] 

                                                                                             

08:03 Order name: IV Saline Lock; Complete Time: 08:30                                        [facility] 

                                                                                             

08:03 Order name: Labs collected and sent; Complete Time: 08:30                               [facility] 

                                                                                             

08:03 Order name: Urine Dipstick-Ancillary (obtain specimen); Complete Time: 09:47            [facility] 

                                                                                             

08:56 Order name: Misc. Order: digital disimpact; Complete Time: 10:01                        [facility] 

                                                                                             

09:29 Order name: Misc. Order: please follow original orders; Complete Time: 09:33            [facility] 

                                                                                                  

Administered Medications:                                                                         

08:31 Drug: NS 0.9% 1000 ml Route: IV; Rate: 1 bolus; Site: right forearm;                    ph  

09:46 Follow up: Response: No adverse reaction; IV Status: Completed infusion                 ph  

09:45 Drug: Dulcolax (bisacodyl) Suppository 10 mg Route: CO;                                 ph  

10:00 Follow up: Response: No adverse reaction                                                ph  

09:46 Not Given (Patient Refused): Lactulose 60 grams 45 ml PO once                           ph  

09:46 Not Given (Patient Refused): morphine 4 mg IVP once over 4 mins                         ph  

09:46 Not Given (Patient Refused): Zofran (Ondansetron) 4 mg IVP once; over 2 minutes         ph  

                                                                                                  

                                                                                                  

Disposition Summary:                                                                              

22 10:07                                                                                    

Discharge Ordered                                                                                 

      Location: Home                                                                          [facility] 

      Problem: new                                                                            andrei 

      Symptoms: have improved                                                                 andrei 

      Condition: Stable                                                                       andrei 

      Diagnosis                                                                                   

        - Constipation, unspecified - impaction                                               andrei 

      Followup:                                                                               anrdei 

        - With:                                                                                    

        - When: 2 - 3 days                                                                         

        - Reason: Recheck today's complaints, Continuance of care, Re-evaluation by your           

      physician                                                                                   

      Followup:                                                                               andrei 

        - With:                                                                                    

        - When: 2 - 3 days                                                                         

        - Reason: Recheck today's complaints, Re-evaluation by your physician                      

      Discharge Instructions:                                                                     

        - Discharge Summary Sheet                                                             andrei 

        - Constipation, Adult                                                                 andrei 

        - High-Fiber Diet                                                                     andrei 

        - Constipation, Adult, Easy-to-Read                                                   andrei 

      Forms:                                                                                      

        - Medication Reconciliation Form                                                      andrei 

        - Thank You Letter                                                                    andrei 

        - Antibiotic Education                                                                andrei 

        - Prescription Opioid Use                                                             andrei 

      Prescriptions:                                                                              

        - Dulcolax (bisacodyl) 10 mg Rectal suppository                                            

            - insert 1 suppository by RECTAL route every 12 hours; 12 suppository; Refills:   andrei 

      0, Product Selection Permitted                                                              

        - Lactulose 10 gram/15 mL Oral Solution                                                    

            - take 30 milliliters by ORAL route once daily; 300 milliliter; Refills: 0,       andrei 

      Product Selection Permitted                                                                 

Signatures:                                                                                       

Dispatcher [employer]                           Sriram Krueger MD MD cha Williams, Irene, RN                     RN                                                      

Raya Barajas RN                      RN   ph                                                   

                                                                                                  

**************************************************************************************************

## 2022-11-29 LAB
BUN BLD-MCNC: 17 MG/DL (ref 7–18)
GLUCOSE SERPLBLD-MCNC: 260 MG/DL (ref 74–106)
HCT VFR BLD CALC: 41.2 % (ref 39.6–49)
INR BLD: 1
LYMPHOCYTES # SPEC AUTO: 2.1 K/UL (ref 0.7–4.9)
MCV RBC: 89.6 FL (ref 80–100)
PMV BLD: 8 FL (ref 7.6–11.3)
POTASSIUM SERPL-SCNC: 4.3 MMOL/L (ref 3.5–5.1)
RBC # BLD: 4.6 M/UL (ref 4.33–5.43)

## 2022-11-29 NOTE — EKG
Test Date:    2022-11-29               Test Time:    09:37:13

Technician:   BENEDICT                                     

                                                     

MEASUREMENT RESULTS:                                       

Intervals:                                           

Rate:         64                                     

ME:           178                                    

QRSD:         108                                    

QT:           432                                    

QTc:          445                                    

Axis:                                                

P:            52                                     

ME:           178                                    

QRS:          3                                      

T:            90                                     

                                                     

INTERPRETIVE STATEMENTS:                                       

                                                     

Normal sinus rhythm

Incomplete right bundle branch block

Nonspecific ST and T wave abnormality

Abnormal ECG

Compared to ECG 06/05/2019 08:55:13

ST (T wave) deviation now present



Electronically Signed On 11-29-22 14:12:25 CST by Hal Barcenas

## 2022-11-29 NOTE — RAD REPORT
EXAM DESCRIPTION:  RAD - Chest Pa And Lat (2 Views) - 11/29/2022 10:03 am

 

CLINICAL HISTORY:  pre op for cath lab

 

COMPARISON:  Two view chest 06/05/2019

 

TECHNIQUE:  Frontal and lateral views of the chest were obtained.

 

FINDINGS:  The lungs are fibrotic with multiple areas of focal lung parenchymal scarring. The interst
itial pattern matches the 2019 comparison. No new mass, infiltrate or acute lung parenchymal process 
seen. Diaphragm is flattened.

 

   Heart size is normal and central vasculature is within normal limits.  No pleural effusion or pneu
mothorax seen.  No acute bony finding noted.  No aortic abnormality.

 

IMPRESSION:  Chronic interstitial fibrotic lung change similar to 2019 study.

 

No acute finding.

## 2022-12-01 ENCOUNTER — HOSPITAL ENCOUNTER (OUTPATIENT)
Dept: HOSPITAL 97 - CCL | Age: 67
Discharge: HOME | End: 2022-12-01
Attending: INTERNAL MEDICINE
Payer: COMMERCIAL

## 2022-12-01 VITALS — OXYGEN SATURATION: 95 % | SYSTOLIC BLOOD PRESSURE: 149 MMHG | DIASTOLIC BLOOD PRESSURE: 80 MMHG

## 2022-12-01 DIAGNOSIS — I10: ICD-10-CM

## 2022-12-01 DIAGNOSIS — Z79.4: ICD-10-CM

## 2022-12-01 DIAGNOSIS — Z79.899: ICD-10-CM

## 2022-12-01 DIAGNOSIS — Z79.82: ICD-10-CM

## 2022-12-01 DIAGNOSIS — I25.10: ICD-10-CM

## 2022-12-01 DIAGNOSIS — Z82.49: ICD-10-CM

## 2022-12-01 DIAGNOSIS — E78.5: ICD-10-CM

## 2022-12-01 DIAGNOSIS — Z87.891: ICD-10-CM

## 2022-12-01 DIAGNOSIS — E11.9: ICD-10-CM

## 2022-12-01 DIAGNOSIS — I71.20: ICD-10-CM

## 2022-12-01 DIAGNOSIS — I70.213: Primary | ICD-10-CM

## 2022-12-01 PROCEDURE — 71046 X-RAY EXAM CHEST 2 VIEWS: CPT

## 2022-12-01 PROCEDURE — 85610 PROTHROMBIN TIME: CPT

## 2022-12-01 PROCEDURE — 93005 ELECTROCARDIOGRAM TRACING: CPT

## 2022-12-01 PROCEDURE — 85730 THROMBOPLASTIN TIME PARTIAL: CPT

## 2022-12-01 PROCEDURE — 75630 X-RAY AORTA LEG ARTERIES: CPT

## 2022-12-01 PROCEDURE — 36415 COLL VENOUS BLD VENIPUNCTURE: CPT

## 2022-12-01 PROCEDURE — 80048 BASIC METABOLIC PNL TOTAL CA: CPT

## 2022-12-01 PROCEDURE — 82947 ASSAY GLUCOSE BLOOD QUANT: CPT

## 2022-12-01 PROCEDURE — 85025 COMPLETE CBC W/AUTO DIFF WBC: CPT

## 2022-12-02 NOTE — OP
Abdomen , soft, nontender, nondistended , no guarding or rigidity , subcutaneous rlq mass palpable 1x1cm , normal bowel sounds , Liver and Spleen , no hepatomegaly present , no hepatosplenomegaly , liver nontender , spleen not palpable, No Ascites, No hernia Date of Procedure:  12/01/2022



Surgeon:  RICHARD CORNEJO



Procedure Performed:  Peripheral angiogram with distal aortogram and runoff.



Access:  Right radial artery 6-Singaporean, closed with TR band.



Complications:  None.



Bleeding:  Less than 10 mL.



Description Of Procedure:  After risks, benefits, and alternatives were explained, patient agreed to 
proceed and signed informed consent.  The patient was [facility] labor[facility] and prepped and draped in usual sterile fashion.  Then I accessed right radial artery using a pe
diatric micropuncture kit, placed a 6-Sing[place]an Slender sheath and took a 4-Singaporean long Ti catheter 
in distal aorta and performed distal aortogram and runoff and then I took a multipurpose catheter 4-F
rench, and it is long and best restenosis common iliac artery measured the pressure in the common harshad
ac artery and was 40-50 mmHg difference in pressure.  Then, we removed the catheter and sheath, place
d TR band with good hemostasis.



Findings:  

1.Distal aorta is patent.

2.Right common iliac in the midportions 90% to 95% stenosis and then followed by 80% stenosis, heavi
ly calcified, and then common femoral artery appears normal.  Profunda is normal.  Then, the right SF
A is patent with diffuse disease ranging between 30% to 50% to multiple locations and then the triple
 vessels below the knee, they were all open.  However, the flow becomes very sluggish and slow due to
 the severe iliac stenosis.

3.The left common iliac is approximately 50% stenosed and then the left internal iliac artery and le
ft common femoral artery are all patent and the left profunda is patent and then the left SFA is with
 diffuse 20% to 30% stenosis on multiple locations and then the triple vessel are patent all the way 
to the foot.



Conclusion:  Severe right common iliac artery stenosis, heavily calcified at 2 tandem lesions.



Plan:  Shockwave lithotripsy followed by angio balloon angioplasty and possible stent placement to be
 done at [facility].





SR/MODL

DD:  12/01/2022 15:15:05Voice ID:  [id number]

DT:  12/02/2022 01:28:46Report ID:  [id number]

## 2023-04-30 ENCOUNTER — HOSPITAL ENCOUNTER (EMERGENCY)
Dept: HOSPITAL 97 - ER | Age: 68
LOS: 1 days | Discharge: HOME | End: 2023-05-01
Payer: COMMERCIAL

## 2023-04-30 DIAGNOSIS — I10: ICD-10-CM

## 2023-04-30 DIAGNOSIS — E87.6: Primary | ICD-10-CM

## 2023-04-30 DIAGNOSIS — R42: ICD-10-CM

## 2023-04-30 DIAGNOSIS — R11.2: ICD-10-CM

## 2023-04-30 DIAGNOSIS — J44.9: ICD-10-CM

## 2023-04-30 LAB
ALBUMIN SERPL BCP-MCNC: 3.2 G/DL (ref 3.4–5)
ALP SERPL-CCNC: 105 U/L (ref 45–117)
ALT SERPL W P-5'-P-CCNC: 22 U/L (ref 16–61)
AST SERPL W P-5'-P-CCNC: 14 U/L (ref 15–37)
BUN BLD-MCNC: 22 MG/DL (ref 7–18)
GLUCOSE SERPLBLD-MCNC: 122 MG/DL (ref 74–106)
HCT VFR BLD CALC: 36 % (ref 39.6–49)
LYMPHOCYTES # SPEC AUTO: 2.2 K/UL (ref 0.7–4.9)
MCV RBC: 86.4 FL (ref 80–100)
NT-PROBNP SERPL-MCNC: 76 PG/ML (ref ?–125)
PMV BLD: 7.9 FL (ref 7.6–11.3)
POTASSIUM SERPL-SCNC: 2.9 MEQ/L (ref 3.5–5.1)
RBC # BLD: 4.17 M/UL (ref 4.33–5.43)
TROPONIN I SERPL HS-MCNC: 6.3 PG/ML (ref ?–58.9)

## 2023-04-30 PROCEDURE — 36415 COLL VENOUS BLD VENIPUNCTURE: CPT

## 2023-04-30 PROCEDURE — 96361 HYDRATE IV INFUSION ADD-ON: CPT

## 2023-04-30 PROCEDURE — 84484 ASSAY OF TROPONIN QUANT: CPT

## 2023-04-30 PROCEDURE — 83880 ASSAY OF NATRIURETIC PEPTIDE: CPT

## 2023-04-30 PROCEDURE — 93005 ELECTROCARDIOGRAM TRACING: CPT

## 2023-04-30 PROCEDURE — 71045 X-RAY EXAM CHEST 1 VIEW: CPT

## 2023-04-30 PROCEDURE — 96375 TX/PRO/DX INJ NEW DRUG ADDON: CPT

## 2023-04-30 PROCEDURE — 96374 THER/PROPH/DIAG INJ IV PUSH: CPT

## 2023-04-30 PROCEDURE — 80076 HEPATIC FUNCTION PANEL: CPT

## 2023-04-30 PROCEDURE — 85025 COMPLETE CBC W/AUTO DIFF WBC: CPT

## 2023-04-30 PROCEDURE — 80048 BASIC METABOLIC PNL TOTAL CA: CPT

## 2023-04-30 PROCEDURE — 99284 EMERGENCY DEPT VISIT MOD MDM: CPT

## 2023-04-30 NOTE — XMS REPORT
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                      00:00:               Irritable [place]



              reaction                      00                          Medical



              s                                                       Branch

 

       No Known No Known Active                                           [facility]

a



       Medicati Medicati                                                  l



       on     on                                                      [place]



       Allergafua Allergafua                                                  



       s      s                                                       







Social History







           Social Habit Start Date Stop Date  Quantity   Comments   Source

 

           [facility]



           Alcohol Std Drinks                                             

 

           History [facility]



           Alcohol Binge                                             

 

           History [facility]



           Alcohol Comment                                             

 

           Exposure to 2023 Not sure              [facility]



           SARS-CoV-2 (event) 00:00:00   10:09:00                         

 

           Tobacco use and 2022 Smokeless tobacco            [facility]



           exposure   00:00:00   00:00:00   non-user              

 

           Alcohol intake 2022 Lifetime              [facility]



                      00:00:00   00:00:00   non-drinker            



                                            (finding)             

 

           Cigarettes smoked 2022                       [facility]





           current (pack per 00:00:00   00:00:00                         



           day) - Reported                                             

 

           Cigarette  2022                       [facility]



           pack-years 00:00:00   00:00:00                         

 

           Social History 2022                       [facility]



                      13:33:44   13:33:44                         

 

           History SDOH 2022 1                     [facility]



           Alcohol Frequency 00:00:00   00:00:00                         

 

           History of tobacco 1973 2022-03-10 Cigarette Smoker            

[facility]



           use        00:00:00   00:00:00                         

 

           Sex Assigned At [birth year] M                     [facility]



           Birth      00:00:00   00:00:00                         









                Smoking Status  Start Date      Stop Date       Source

 

                Smokes tobacco daily 2022 00:00:00                 [facility]







Medications







       Ordered Filled Start  Stop   Current Ordering Indication Dosage Frequency

 Signature

                    Comments            Components          Source



     Medication Medication Date Date Medication? Clinician                (SIG) 

          



     Name Name                                                   

 

     NIFEDIPINE      2022      Yes       [id number]           TAKE 1           

[facility]



     XL 30 mg 24      2-07                               TABLET BY           ity

 of



     hr tablet      00:00:                               MOUTH           [place]



                                                TWICE           Medical



                                                  DAILY           Branch

 

     NIFEDIPINE      2022      Yes       [id number]           TAKE 1           

[facility]



     XL 30 mg 24      2-07                               TABLET BY           ity

 of



     hr tablet      00:00:                               MOUTH           [place]



                                                TWICE           Medical



                                                  DAILY           Branch

 

     NIFEDIPINE      2022      Yes       [id number]           TAKE 1           

[facility]



     XL 30 mg 24      2-07                               TABLET BY           ity

 of



     hr tablet      00:00:                               MOUTH           [place]



                                                TWICE           Medical



                                                  DAILY           Branch

 

     traMADol      2022      Yes       [id number] 100mg Q6H  Take 2           [facility]



     (Ultram) 50      1-30                               tablets           Healt

h



     MG tablet      00:00:                               (100 mg           



               00                                 total) by           



                                                  mouth           



                                                  every 6           



                                                  (six)           



                                                  hours if           



                                                  needed for           



                                                  severe           



                                                  pain.           

 

     traMADol      2022      Yes       [id number] 100mg Q6H  Take 2           [facility]



     (Ultram) 50      0-21                               tablets           Healt

h



     MG tablet      00:00:                               (100 mg           



               00                                 total) by           



                                                  mouth           



                                                  every 6           



                                                  (six)           



                                                  hours if           



                                                  needed for           



                                                  severe           



                                                  pain.           

 

     ceFAZolin            No                       Route: IV,           Me

moria



     (ANES)      7-                               Drug form:           l



               00:04:                               INJ, ONCE,           [place]



                                                Stop date:           



                                                  07/26/22           



                                                  19:04:00           



                                                  CDT            

 

     propofol            No                       Route: IV,           Mem

oria



     (ANES)                                     Drug form:           l



               00:04:                               INJ, ONCE,                                            Stop date:           



                                                  22           



                                                  19:04:00           



                                                  CDT            

 

     lidocaine            No                       Route: IV,           Me

moria



     (ANES)                                     Drug form:           l



               00:04:                               INJ, ONCE,                                            Stop date:           



                                                  22           



                                                  19:04:00           



                                                  CDT            

 

     ondansetron            No                       Route: IV,           

[facility]



     (ANES)                                     Drug form:           l



               00:04:                               INJ, ONCE,                                            Stop date:           



                                                  22           



                                                  19:04:00           



                                                  CDT            

 

     dexamethaso            No                       Route: IV,           

[facility]



     ne (ANES)                                     Drug form:           l



               00:04:                               INJ, ONCE,                                            Stop date:           



                                                  22           



                                                  19:04:00           



                                                  CDT            

 

     ketOROLAC            No                       IV, ONCE           [facility]

berna



     (ANES)                                                    l



               00:04:                                                                                              

 

     Lactated            No                       Route: IV,           Mem

oria



     Ringers                                     Total           l



     Injection      23:08:                               Volume:           Viry

nn



     IV (ANES)      00                                 1,000,           



     1000 mL                                         Start           



                                                  date:           



                                                  22           



                                                  18:08:00           



                                                  CDT, Stop           



                                                  date:           



                                                  22           



                                                  19:08:00           



                                                  CDT            

 

     Lactated            No                       1,000 mL,           [facility]less[facility]

berna



     Ringers                                     Rate: 75           l



     Injection      17:25:                               ml/hr,           Rob

n



     IV 1,000 mL      00                                 Infuse           



                                                  over: 13.3           



                                                  hr, Route:           



                                                  IV, Dosing           



                                                  Weight           



                                                  108.455           



                                                  kg, Total           



                                                  Volume:           



                                                  1,000,           



                                                  Start           



                                                  date:           



                                                  22           



                                                  12:25:00           



                                                  CDT,           



                                                  Duration:           



                                                  30 day,           



                                                  Stop date:           



                                                  22           



                                                  12:24:00           



                                                  CDT, BSA:           



                                                  2.41 m2, 0           

 

     ezetimibe            Yes                      10 mg = 1           Mem

oria



     10 mg oral      7-25                               tab, PO,           l



     tablet      13:22:                               Daily, 0                                            Refill(s)           

 

     traMADol            Yes       [id number] 100mg Q6H  Take 2           [facility]



     (Ultram) 50      7-22                               tablets           Healt

h



     MG tablet      00:00:                               (100 mg           



               00                                 total) by           



                                                  mouth           



                                                  every 6           



                                                  (six)           



                                                  hours if           



                                                  needed for           



                                                  severe           



                                                  pain.           

 

     ondansetron      -0      Yes       [id number] 8mg       Take 1           

[facility]



     (Zofran) 8      7-22                               tablet (8           Heal

th



     MG tablet      00:00:                               mg total)           



               00                                 by mouth           



                                                  every 8           



                                                  (eight)           



                                                  hours if           



                                                  needed for           



                                                  vomiting           



                                                  or nausea.           

 

     traMADol      -0      Yes       [id number] 100mg Q6H  Take 2           [facility]



     (Ultram) 50      7-22                               tablets           Healt

h



     MG tablet      00:00:                               (100 mg           



               00                                 total) by           



                                                  mouth           



                                                  every 6           



                                                  (six)           



                                                  hours if           



                                                  needed for           



                                                  severe           



                                                  pain.           

 

     ondansetron      -0      Yes       [id number] 8mg       Take 1           

[facility]



     (Zofran) 8      7-22                               tablet (8           Heal

th



     MG tablet      00:00:                               mg total)           



               00                                 by mouth           



                                                  every 8           



                                                  (eight)           



                                                  hours if           



                                                  needed for           



                                                  vomiting           



                                                  or nausea.           

 

     traMADol      -0      Yes       [id number] 100mg Q6H  Take 2           [facility]



     (Ultram) 50      7-22                               tablets           Healt

h



     MG tablet      00:00:                               (100 mg           



               00                                 total) by           



                                                  mouth           



                                                  every 6           



                                                  (six)           



                                                  hours if           



                                                  needed for           



                                                  severe           



                                                  pain.           

 

     ondansetron      -0      Yes       [id number] 8mg       Take 1           

[facility]



     (Zofran) 8      7-22                               tablet (8           Heal

th



     MG tablet      00:00:                               mg total)           



               00                                 by mouth           



                                                  every 8           



                                                  (eight)           



                                                  hours if           



                                                  needed for           



                                                  vomiting           



                                                  or nausea.           

 

     meloxicam      -2022- No        [id number] 15mg QD   Take 1           [facility]



     (Mobic) 15      7-13 10-12                          tablet (15           He

alth



     MG tablet      00:00: 04:59                          mg total)           



               00   :00                           by mouth 1           



                                                  (one) time           



                                                  each day.           

 

     meloxicam      -2022- No        [id number] 15mg QD   Take 1           [facility]



     (Mobic) 15      7-13 10-12                          tablet (15           He

alth



     MG tablet      00:00: 04:59                          mg total)           



               00   :00                           by mouth 1           



                                                  (one) time           



                                                  each day.           

 

     meloxicam      2022- No        [id number] 15mg QD   Take 1           [facility]



     (Mobic) 15      7-13 10-12                          tablet (15           He

alth



     MG tablet      00:00: 04:59                          mg total)           



               00   :00                           by mouth 1           



                                                  (one) time           



                                                  each day.           

 

     meloxicam      2022- No        [id number] 15mg QD   Take 1           [facility]



     (Mobic) 15      7-13 10-12                          tablet (15           He

alth



     MG tablet      00:00: 04:59                          mg total)           



               00   :00                           by mouth 1           



                                                  (one) time           



                                                  each day.           

 

     sulfamethox      2022- No        [id number] 1{tbl} Q.5D Take 1         

  [facility]



     azole-trime      7--24                          tablet by           He

alth



     thoprim      00:00: 04:59                          mouth in           



     (Bactrim      00   :00                           the            



     DS) 800-160                                         morning           



     MG tablet                                         and 1           



                                                  tablet in           



                                                  the            



                                                  evening.           



                                                  Do all           



                                                  this for           



                                                  10 days.           

 

     sulfamethox      2022- No        [id number] 1{tbl} Q.5D Take 1         

  [facility]



     azole-trime      7--24                          tablet by           He

alth



     thoprim      00:00: 04:59                          mouth in           



     (Bactrim      00   :00                           the            



     DS) 800-160                                         morning           



     MG tablet                                         and 1           



                                                  tablet in           



                                                  the            



                                                  evening.           



                                                  Do all           



                                                  this for           



                                                  10 days.           

 

     traMADol            Yes       [id number] 100mg Q6H  Take 2           [facility]



     (Ultram) 50      5-27                               tablets           Healt

h



     MG tablet      00:00:                               (100 mg           



               00                                 total) by           



                                                  mouth           



                                                  every 6           



                                                  (six)           



                                                  hours if           



                                                  needed for           



                                                  severe           



                                                  pain.           

 

     traMADol      0      Yes       [id number] 100mg Q6H  Take 2           [facility]



     (Ultram) 50      5-27                               tablets           Healt

h



     MG tablet      00:00:                               (100 mg           



               00                                 total) by           



                                                  mouth           



                                                  every 6           



                                                  (six)           



                                                  hours if           



                                                  needed for           



                                                  severe           



                                                  pain.           

 

     traMADol      0      Yes       [id number] 100mg Q6H  Take 2           [facility]



     (Ultram) 50      5-27                               tablets           Healt

h



     MG tablet      00:00:                               (100 mg           



               00                                 total) by           



                                                  mouth           



                                                  every 6           



                                                  (six)           



                                                  hours if           



                                                  needed for           



                                                  severe           



                                                  pain.           

 

     traMADol      -0      Yes       [id number] 100mg Q6H  Take 2           [facility]



     (Ultram) 50      5-27                               tablets           Healt

h



     MG tablet      00:00:                               (100 mg           



               00                                 total) by           



                                                  mouth           



                                                  every 6           



                                                  (six)           



                                                  hours if           



                                                  needed for           



                                                  severe           



                                                  pain.           

 

     traMADol      0      Yes       [id number] 100mg Q6H  Take 2           [facility]



     (Ultram) 50      5-27                               tablets           Healt

h



     MG tablet      00:00:                               (100 mg           



               00                                 total) by           



                                                  mouth           



                                                  every 6           



                                                  (six)           



                                                  hours if           



                                                  needed for           



                                                  severe           



                                                  pain.           

 

     traMADol      2022-0      Yes       [id number] 100mg Q6H  Take 2           [facility]



     (Ultram) 50      5-27                               tablets           [facility]

[facility]



     MG tablet      00:00:                               (100 mg           



               00                                 total) by           



                                                  mouth           



                                                  every 6           



                                                  (six)           



                                                  hours if           



                                                  needed for           



                                                  severe           



                                                  pain.           

 

     glycopyrrol            No                       Route: IV,           

[facility]



     ate (ANES)                                     Drug form:           l



               19:47:                               INJ, ONCE,                                            Stop date:           



                                                  22           



                                                  14:47:00           



                                                  CDT            

 

     ePHEDrine            No                       Route: IV,           [facility]

[facility]



     (ANES)                                     Drug form:           l



               19:47:                               INJ, ONCE,                                            Stop date:           



                                                  22           



                                                  14:47:00           



                                                  CDT            

 

     ceFAZolin            No                       Route: IV,           [facility]

[facility]



     (ANES)                                     Drug form:           l



               18:46:                               INJ, ONCE,                                            Stop date:           



                                                  22           



                                                  13:46:00           



                                                  CDT            

 

     propofol            No                       Route: IV,           [facility]

[facility]



     (ANES)                                     Drug form:           l



               18:26:                               INJ, ONCE,                                            Stop date:           



                                                  22           



                                                  13:26:00           



                                                  CDT            

 

     rocuronium            No                       Route: IV,           [facility]

[facility]



     (ANES)                                     Drug form:           l



               18:26:                               INJ, ONCE,                                            Stop date:           



                                                  22           



                                                  13:26:00           



                                                  CDT            

 

     lidocaine            No                       Route: IV,           [facility]

[facility]



     (ANES)                                     Drug form:           l



               18:21:                               INJ, ONCE,                                            Stop date:           



                                                  22           



                                                  13:21:00           



                                                  CDT            

 

     fentaNYL            No                       Route: IV,           [facility]

[facility]



     (ANES)                                     Drug form:           l



               18:21:                               INJ, ONCE,                                            Stop date:           



                                                  22           



                                                  13:21:00           



                                                  CDT            

 

     ANES            No                       10 mg,           [facility]



     hydrALAZINE      5-05                               Route:           l



               18:20:                               IVP,                                            Q20Min,           



                                                  Dosing           



                                                  Weight           



                                                  107.1, kg,           



                                                  PRN            



                                                  Elevated           



                                                  BP, Start           



                                                  date:           



                                                  22           



                                                  13:20:00           



                                                  CDT,           



                                                  Duration:           



                                                  2 doses or           



                                                  times,           



                                                  Stop date:           



                                                  Limited #           



                                                  of times           

 

     ANES      2022-0      No                       10 mg,           [facility]



     labetalol      5-05                               Route:           l



               18:20:                               IVP,           [facility]



               00                                 Q5Min,           



                                                  Dosing           



                                                  Weight           



                                                  107.1, kg,           



                                                  PRN            



                                                  Elevated           



                                                  BP, Start           



                                                  date:           



                                                  22           



                                                  13:20:00           



                                                  CDT,           



                                                  Duration:           



                                                  5 doses or           



                                                  times,           



                                                  Stop date:           



                                                  Limited #           



                                                  of times           

 

     ANES      0      No                       1,000 mg,           [facility]



     acetaminoph      5-05                               Route: PO,           l



     en        18:20:                               Drug form:           [facility]



               00                                 TAB, ONCE,           



                                                  Dosing           



                                                  Weight           



                                                  107.1, kg,           



                                                  PRN Pain           



                                                  Score 1-3,           



                                                  Start           



                                                  date:           



                                                  22           



                                                  13:20:00           



                                                  CDT            

 

     ANES      -0      No                       5 mg,           [facility]



     oxyCODONE 5      5-05                               Route: PO,           l



     mg        18:20:                               Drug form:           [place]



     immediate      00                                 TAB, Q4H,           



     release                                         Dosing           



     tablet                                         Weight           



                                                  107.1, kg,           



                                                  PRN Pain           



                                                  Score 4-6,           



                                                  Start           



                                                  date:           



                                                  22           



                                                  13:20:00           



                                                  CDT,           



                                                  Duration:           



                                                  30 day,           



                                                  Stop date:           



                                                  22           



                                                  13:19:00           



                                                  CDT            

 

     ANES      0      No                       0.5 mg,           [facility]



     HYDROmorpho      5-05                               Route:           l



     ne        18:20:                               IVP,           [facility]



               00                                 Q5Min,           



                                                  Dosing           



                                                  Weight           



                                                  107.1, kg,           



                                                  PRN Pain           



                                                  Score           



                                                  7-10,           



                                                  Start           



                                                  date:           



                                                  22           



                                                  13:20:00           



                                                  CDT,           



                                                  Duration:           



                                                  4 doses or           



                                                  times,           



                                                  Stop date:           



                                                  Limited #           



                                                  of times           

 

     ANES      0      No                       0.2 mg,           [facility]



     flumazenil      5-05                               Route:           l



               18:20:                               IVP, PRN,           [facility]



               00                                 Dosing           



                                                  Weight           



                                                  107.1, kg,           



                                                  PRN            



                                                  Benzodiaze           



                                                  pine           



                                                  Reversal,           



                                                  Initial           



                                                  dose,           



                                                  Start           



                                                  date:           



                                                  22           



                                                  13:20:00           



                                                  CDT,           



                                                  Duration:           



                                                  30 day,           



                                                  Stop date:           



                                                  22           



                                                  13:19:00           



                                                  CDT            

 

     ANES      -0      No                       0.4 mg,           [facility]



     naloxone      5-05                               Route:           l



               18:20:                               IVP,           [facility]



               00                                 Q2MIN,           



                                                  Dosing           



                                                  Weight           



                                                  107.1, kg,           



                                                  PRN            



                                                  Narcotic           



                                                  Reversal,           



                                                  Start           



                                                  date:           



                                                  22           



                                                  13:20:00           



                                                  CDT,           



                                                  Duration:           



                                                  8 doses or           



                                                  times,           



                                                  Stop date:           



                                                  Limited #           



                                                  of times           

 

     ANES      2022-0      No                       2.49 mg,           [facility]



     albuterol      -05                               Route:           l



     0.083%      18:20:                               NEB,           [place]



     inhalation      00                                 Q20Min,           



     solution                                         Dosing           



                                                  Weight           



                                                  107.1, kg,           



                                                  PRN            



                                                  Wheezing,           



                                                  Start           



                                                  date:           



                                                  22           



                                                  13:20:00           



                                                  CDT,           



                                                  Duration:           



                                                  30 day,           



                                                  Stop date:           



                                                  22           



                                                  13:19:00           



                                                  CDT            

 

     ANES            No                       4 mg,           [facility]



     ondansetron                                     Route:           l



               18:20:                               IVP, ONCE,           [place]



               00                                 Dosing           



                                                  Weight           



                                                  107.1, kg,           



                                                  PRN Nausea           



                                                  &              



                                                  Vomiting,           



                                                  Start           



                                                  date:           



                                                  22           



                                                  13:20:00           



                                                  CDT            

 

     Lactated            No                       Route: IV,           [facility]

[facility]



     Ringers      05                               Total           l



     Injection      17:44:                               Volume:           [facility]

[facility]



     IV (ANES)      00                                 1,000,           



     1000 mL                                         Start           



                                                  date:           



                                                  22           



                                                  12:44:00           



                                                  CDT, Stop           



                                                  date:           



                                                  22           



                                                  13:44:00           



                                                  CDT            

 

     Ocuvite            Yes                      PO, Daily,           [facility]

[facility]



               5-05                               0              l



               16:42:                               Refill(s)           [place]



                                                               

 

     NIFEdipine            Yes                      30 mg = 1           [facility]

[facility]



     (Eqv-Adalat      5-05                               tab, PO,           l



     CC) 30 mg      16:41:                               Daily, 0           [facility]

[facility]



     oral      00                                 Refill(s)           



     tablet,                                                        



     extended                                                        



     release                                                        

 

     Isolyte S            No                       1,000 mL,           [facility]

[facility]



     PH 7.4 1000      505                               Rate: 75           l



     mL        16:32:                               ml/hr,           [place]                                 Infuse           



                                                  over: 13.3           



                                                  hr, Route:           



                                                  IV, Dosing           



                                                  Weight           



                                                  108.182           



                                                  kg, Total           



                                                  Volume:           



                                                  1,000,           



                                                  Start           



                                                  date:           



                                                  22           



                                                  11:32:00           



                                                  CDT,           



                                                  Duration:           



                                                  30 day,           



                                                  Stop date:           



                                                  22           



                                                  11:31:00           



                                                  CDT, BSA:           



                                                  2.4 m2           

 

     acetaminoph            No                       1,000 mg,           [facility]

[facility]



     en        505                               Route: PO,           l



               16:32:                               Drug form:           [facility]



                                                TAB, PRE           



                                                  OP, Dosing           



                                                  Weight           



                                                  108.182,           



                                                  kg,            



                                                  Priority:           



                                                  NOW, Start           



                                                  date:           



                                                  22           



                                                  11:32:00           



                                                  CDT,           



                                                  Duration:           



                                                  1 doses or           



                                                  times           

 

     traMADol      2022-0      Yes       [id number] 100mg Q6H  Take 2           [facility]



     (Ultram) 50      5-04                               tablets           [facility]

[facility]



     MG tablet      00:00:                               (100 mg           



               00                                 total) by           



                                                  mouth           



                                                  every 6           



                                                  (six)           



                                                  hours if           



                                                  needed for           



                                                  severe           



                                                  pain.           

 

     ondansetron      -0      Yes       [id number] 8mg       Take 1          

 [facility]



     (Zofran) 8      5-04                               tablet (8           [facility]

[facility]



     MG tablet      00:00:                               mg total)           



               00                                 by mouth           



                                                  every 8           



                                                  (eight)           



                                                  hours if           



                                                  needed for           



                                                  nausea.           

 

     traMADol      -0      Yes       [id number] 100mg Q6H  Take 2           [facility]



     (Ultram) 50      5-04                               tablets           [facility]

[facility]



     MG tablet      00:00:                               (100 mg           



               00                                 total) by           



                                                  mouth           



                                                  every 6           



                                                  (six)           



                                                  hours if           



                                                  needed for           



                                                  severe           



                                                  pain.           

 

     ondansetron      -0      Yes       [id number] 8mg       Take 1          

 [facility]



     (Zofran) 8      5-04                               tablet (8           [facility]

[facility]



     MG tablet      00:00:                               mg total)           



               00                                 by mouth           



                                                  every 8           



                                                  (eight)           



                                                  hours if           



                                                  needed for           



                                                  nausea.           

 

     traMADol      -0      Yes       [id number] 100mg Q6H  Take 2           [facility]



     (Ultram) 50      5-04                               tablets           [facility]

[facility]



     MG tablet      00:00:                               (100 mg           



               00                                 total) by           



                                                  mouth           



                                                  every 6           



                                                  (six)           



                                                  hours if           



                                                  needed for           



                                                  severe           



                                                  pain.           

 

     ondansetron      -0      Yes       [id number] 8mg       Take 1          

 [facility]



     (Zofran) 8      5-04                               tablet (8           [facility]

[facility]



     MG tablet      00:00:                               mg total)           



               00                                 by mouth           



                                                  every 8           



                                                  (eight)           



                                                  hours if           



                                                  needed for           



                                                  nausea.           

 

     traMADol      -0      Yes       [id number] 100mg Q6H  Take 2           [facility]



     (Ultram) 50      5-04                               tablets           [facility]

h



     MG tablet      00:00:                               (100 mg           



               00                                 total) by           



                                                  mouth           



                                                  every 6           



                                                  (six)           



                                                  hours if           



                                                  needed for           



                                                  severe           



                                                  pain.           

 

     ondansetron      -0      Yes       [id number] 8mg       Take 1          

 [facility]



     (Zofran) 8      5-04                               tablet (8           [facility]

[facility]



     MG tablet      00:00:                               mg total)           



               00                                 by mouth           



                                                  every 8           



                                                  (eight)           



                                                  hours if           



                                                  needed for           



                                                  nausea.           

 

     traMADol      2-0      Yes       [id number] 100mg Q6H  Take 2           [facility]



     (Ultram) 50      5-04                               tablets           [facility]

h



     MG tablet      00:00:                               (100 mg           



               00                                 total) by           



                                                  mouth           



                                                  every 6           



                                                  (six)           



                                                  hours if           



                                                  needed for           



                                                  severe           



                                                  pain.           

 

     ondansetron      -0      Yes       [id number] 8mg       Take 1          

 [facility]



     (Zofran) 8      5-04                               tablet (8           [facility]

[facility]



     MG tablet      00:00:                               mg total)           



               00                                 by mouth           



                                                  every 8           



                                                  (eight)           



                                                  hours if           



                                                  needed for           



                                                  nausea.           

 

     traMADol            Yes       [id number] 100mg Q6H  Take 2           [facility]



     (Ultram) 50      5-04                               tablets           [facility]

h



     MG tablet      00:00:                               (100 mg           



               00                                 total) by           



                                                  mouth           



                                                  every 6           



                                                  (six)           



                                                  hours if           



                                                  needed for           



                                                  severe           



                                                  pain.           

 

     ondansetron            Yes       [id number] 8mg       Take 1          

 [facility]



     (Zofran) 8      5-04                               tablet (8           [facility]

[facility]



     MG tablet      00:00:                               mg total)           



               00                                 by mouth           



                                                  every 8           



                                                  (eight)           



                                                  hours if           



                                                  needed for           



                                                  nausea.           

 

     traMADol            Yes       [id number] 100mg Q6H  Take 2           [facility]



     (Ultram) 50      5-04                               tablets           [facility]

h



     MG tablet      00:00:                               (100 mg           



               00                                 total) by           



                                                  mouth           



                                                  every 6           



                                                  (six)           



                                                  hours if           



                                                  needed for           



                                                  severe           



                                                  pain.           

 

     ondansetron            Yes       [id number] 8mg       Take 1          

 [facility]



     (Zofran) 8      5-04                               tablet (8           [facility]

[facility]



     MG tablet      00:00:                               mg total)           



               00                                 by mouth           



                                                  every 8           



                                                  (eight)           



                                                  hours if           



                                                  needed for           



                                                  nausea.           

 

     losartan 50            Yes                      50 mg = 1           [facility]

[facility]



     mg oral      5-03                               tab, PO,           l



     tablet      15:52:                               BID, 0           [facility]



               00                                 Refill(s)           

 

     Prevacid 15            Yes                      15 mg = 1           [facility]

[facility]



     mg oral      5-03                               cap, PO,           l



     delayed      15:51:                               Daily, 0           [facility]

n



     release      00                                 Refill(s)           



     capsule                                                        

 

     gabapentin            Yes                      300 mg = 1           [facility]

[facility]



     300 mg oral      5-03                               cap, PO,           l



     capsule      15:50:                               QPM, 0           [facility]



               00                                 Refill(s)           

 

     Tresiba            Yes                      40 unit,           [facility]

a



               5-03                               SUB-Q,           l



               15:50:                               Daily, 0           [place]



               00                                 Refill(s)           

 

     carvedilol            Yes                      25 mg = 1           [facility]

[facility]



     25 mg oral      5-03                               tab, PO,           l



     tablet      15:49:                               BID, 0           [place]



               00                                 Refill(s)           

 

     atorvastati            Yes                      80 mg = 1           [facility]

[facility]



     n 80 mg      5-03                               tab, PO,           l



     oral tablet      15:48:                               Daily, 0           [facility]

[facility]



               00                                 Refill(s)           

 

     aspirin      0      Yes                      81 mg, PO,           [facility]

[facility]



               5-03                               Daily, 0           l



               15:48:                               Refill(s)           [place]



               00                                                

 

     gabapentin      0      Yes            300mg      Take 300           [facility]

[facility]



     (NEURONTIN)      4-06                               mg by           [facility]



     300 mg      13:01:                               mouth           [place]



     capsule      34                                 daily.           [facility]



                                                                 [facility]

 

     lansoprazol      0      Yes            15mg      Take 15 mg           

[facility]



     e         4-06                               by mouth 2           [facility]



     (PREVACID)      13:01:                               (two)           [place]



     15 mg      34                                 times           [facility]



     capsule                                         daily.           [facility]

 

     ascorbic      0      Yes            500mg      Take 500           [facility]

[facility]



     acid      4-06                               mg by           [facility]



     (VITAMIN C)      13:01:                               mouth           [place]



     500 mg      34                                 daily.           [facility]



     tablet                                                        [facility]

 

     Docusate            Yes       [id number] 100mg      Take 100           

[facility]



     Sodium 100      4-06                               mg by           [facility]



     mg tablet      13:01:                               mouth           [place]



               34                                 daily.           [facility]



                                                                 [facility]

 

     dutasteride      0      Yes            .5mg      Take 0.5           [facility]

[facility]



     0.5 mg      4-06                               mg by           [facility]



     capsule      13:01:                               mouth           [place]



               34                                 daily.           [facility]



                                                                 [facility]

 

     levocetiriz      0      Yes            5mg       Take 5 mg           [facility]

[facility]



     ine 5 mg      4-06                               by mouth           [facility]



     tablet      13:01:                               every           [place]



               34                                 evening.           [facility]



                                                                 [facility]

 

     montelukast      0      Yes            10mg      Take 10 mg           

[facility]



     10 mg      4-06                               by mouth.           [facility]



     tablet      13:01:                                              [place]



               34                                                [facility]



                                                                 [facility]

 

     insulin      -0      Yes            20U       inject 20           [facility]

rs



     degludec      4-06                               Units           [facility]



     200 unit/mL      13:01:                               under the           [facility]

[facility]



     (3 mL) In      34                                 skin.           Medical



                                                  Inject 20           [facility]



                                                  Units           



                                                  Daily           

 

     gabapentin      0      Yes            300mg      Take 300           [facility]

[facility]



     (NEURONTIN)      4-06                               mg by           [facility]



     300 mg      13:01:                               mouth           [place]



     capsule      34                                 daily.           [facility]



                                                                 [facility]

 

     lansoprazol      0      Yes            15mg      Take 15 mg           

[facility]



     e         4-06                               by mouth 2           [facility]



     (PREVACID)      13:01:                               (two)           [place]



     15 mg      34                                 times           [facility]



     capsule                                         daily.           [facility]

 

     ascorbic      -0      Yes            500mg      Take 500           [facility]

[facility]



     acid      4-06                               mg by           [facility]



     (VITAMIN C)      13:01:                               mouth           [place]



     500 mg      34                                 daily.           [facility]



     tablet                                                        [facility]

 

     Docusate            Yes       [id number] 100mg      Take 100           

[facility]



     Sodium 100      4-06                               mg by           [facility]



     mg tablet      13:01:                               mouth           [place]



               34                                 daily.           [facility]



                                                                 [facility]

 

     dutasteride      0      Yes            .5mg      Take 0.5           [facility]

[facility]



     0.5 mg      4-06                               mg by           [facility]



     capsule      13:01:                               mouth           [place]



               34                                 daily.           [facility]



                                                                 [facility]

 

     levocetiriz      0      Yes            5mg       Take 5 mg           U

[facility]



     ine 5 mg      4-06                               by mouth           [facility]



     tablet      13:01:                               every           [place]



               34                                 evening.           [facility]



                                                                 [facility]

 

     montelukast      0      Yes            10mg      Take 10 mg           

[facility]



     10 mg      4-06                               by mouth.           [facility]



     tablet      13:01:                                              [place]



               34                                                [facility]



                                                                 [facility]

 

     insulin            Yes            20U       inject 20           [facility]

rs



     degludec      4-06                               Units           [facility]



     200 unit/mL      13:01:                               under the           [facility]

[facility]



     (3 mL) InPn      34                                 skin.           Medical



                                                  Inject 20           [facility]



                                                  Units           



                                                  Daily           

 

     gabapentin            Yes            300mg      Take 300           [facility]

[facility]



     (NEURONTIN)      4-06                               mg by           [facility]



     300 mg      13:01:                               mouth           [place]



     capsule      34                                 daily.           [facility]



                                                                 [facility]

 

     lansoprazol            Yes            15mg      Take 15 mg           

[facility]



     e         4-06                               by mouth 2           [facility]



     (PREVACID)      13:01:                               (two)           [place]



     15 mg      34                                 times           [facility]



     capsule                                         daily.           [facility]

 

     ascorbic            Yes            500mg      Take 500           [facility]

[facility]



     acid      4-06                               mg by           [facility]



     (VITAMIN C)      13:01:                               mouth           [place]



     500 mg      34                                 daily.           [facility]



     tablet                                                        [facility]

 

     Docusate            Yes       [id number] 100mg      Take 100           

[facility]



     Sodium 100      4-06                               mg by           [facility]



     mg tablet      13:01:                               mouth           [place]



               34                                 daily.           [facility]



                                                                 [facility]

 

     dutasteride      0      Yes            .5mg      Take 0.5           [facility]

[facility]



     0.5 mg      4-06                               mg by           [facility]



     capsule      13:01:                               mouth           [place]



               34                                 daily.           Medical



                                                                 [facility]

 

     levocetiriz      0      Yes            5mg       Take 5 mg           U

[facility]



     ine 5 mg      4-06                               by mouth           [facility]



     tablet      13:01:                               every           [place]



               34                                 evening.           Medical



                                                                 [facility]

 

     montelukast      -0      Yes            10mg      Take 10 mg           

[facility]



     10 mg      4-06                               by mouth.           [facility]



     tablet      13:01:                                              [place]



               34                                                [facility]



                                                                 [facility]

 

     insulin      2022-0      Yes            20U       inject 20           [facility]

rs



     degludec      4-06                               Units           [facility]



     200 unit/mL      13:01:                               under the           [facility]

[facility]



     (3 mL) InPn      34                                 skin.           Medical



                                                  Inject 20           [facility]



                                                  Units           



                                                  Daily           

 

     gabapentin      -0      Yes            300mg      Take 300           [facility]

[facility]



     (NEURONTIN)      4-06                               mg by           [facility]



     300 mg      13:01:                               mouth           [place]



     capsule      34                                 daily.           [facility]



                                                                 [facility]

 

     lansoprazol      -0      Yes            15mg      Take 15 mg           

[facility]



     e         4-06                               by mouth 2           [facility]



     (PREVACID)      13:01:                               (two)           [place]



     15 mg      34                                 times           [facility]



     capsule                                         daily.           [facility]

 

     ascorbic      -0      Yes            500mg      Take 500           [facility]

[facility]



     acid      4-06                               mg by           [facility]



     (VITAMIN C)      13:01:                               mouth           [place]



     500 mg      34                                 daily.           [facility]



     tablet                                                        [facility]

 

     Docusate      -0      Yes       [id number] 100mg      Take 100           

[facility]



     Sodium 100      4-06                               mg by           [facility]



     mg tablet      13:01:                               mouth           [place]



               34                                 daily.           [facility]



                                                                 [facility]

 

     dutasteride      -0      Yes            .5mg      Take 0.5           [facility]

[facility]



     0.5 mg      4-06                               mg by           [facility]



     capsule      13:01:                               mouth           [place]



               34                                 daily.           [facility]



                                                                 [facility]

 

     levocetiriz      -0      Yes            5mg       Take 5 mg           U

[facility]



     ine 5 mg      4-06                               by mouth           [facility]



     tablet      13:01:                               every           [place]



               34                                 evening.           [facility]



                                                                 [facility]

 

     montelukast      -0      Yes            10mg      Take 10 mg           

[facility]



     10 mg      4-06                               by mouth.           [facility]



     tablet      13:01:                                              [place]



               34                                                [facility]



                                                                 [facility]

 

     insulin      -0      Yes            20U       inject 20           [facility]

rs



     degludec      4-06                               Units           [facility]



     200 unit/mL      13:01:                               under the           [facility]

[facility]



     (3 mL) InPn      34                                 skin.           Medical



                                                  Inject 20           [facility]



                                                  Units           



                                                  Daily           

 

     gabapentin      -0      Yes            300mg      Take 300           [facility]

[facility]



     (NEURONTIN)      4-06                               mg by           [facility]



     300 mg      13:01:                               mouth           [place]



     capsule      34                                 daily.           [facility]



                                                                 [facility]

 

     lansoprazol      -0      Yes            15mg      Take 15 mg           

[facility]



     e         4-06                               by mouth 2           [facility]



     (PREVACID)      13:01:                               (two)           [place]



     15 mg      34                                 times           Medical



     capsule                                         daily.           [facility]

 

     ascorbic      -0      Yes            500mg      Take 500           [facility]

[facility]



     acid      4-06                               mg by           [facility]



     (VITAMIN C)      13:01:                               mouth           [place]



     500 mg      34                                 daily.           Medical



     tablet                                                        [facility]

 

     Docusate      0      Yes       [id number] 100mg      Take 100           

[facility]



     Sodium 100      4-06                               mg by           [facility]



     mg tablet      13:01:                               mouth           [place]



               34                                 daily.           Medical



                                                                 [facility]

 

     dutasteride      0      Yes            .5mg      Take 0.5           [facility]

[facility]



     0.5 mg      4-06                               mg by           [facility]



     capsule      13:01:                               mouth           [place]



               34                                 daily.           Medical



                                                                 [facility]

 

     levocetiriz      -0      Yes            5mg       Take 5 mg           U

[facility]



     ine 5 mg      4-06                               by mouth           [facility]



     tablet      13:01:                               every           [place]



               34                                 evening.           Medical



                                                                 [facility]

 

     montelukast      -0      Yes            10mg      Take 10 mg           

[facility]



     10 mg      4-06                               by mouth.           [facility]



     tablet      13:01:                                              [place]



               34                                                Medical



                                                                 [facility]

 

     insulin            Yes            20U       inject 20           [facility]

rs



     degludec      4-06                               Units           [facility]



     200 unit/mL      13:01:                               under the           [facility]

[facility]



     (3 mL) InPn      34                                 skin.           Medical



                                                  Inject 20           [facility]



                                                  Units           



                                                  Daily           

 

     gabapentin            Yes            300mg      Take 300           [facility]

[facility]



     (NEURONTIN)      4-06                               mg by           [facility]



     300 mg      13:01:                               mouth           [place]



     capsule      34                                 daily.           Medical



                                                                 [facility]

 

     lansoprazol            Yes            15mg      Take 15 mg           

[facility]



     e         4-06                               by mouth 2           [facility]



     (PREVACID)      13:01:                               (two)           [place]



     15 mg      34                                 times           Medical



     capsule                                         daily.           [facility]

 

     ascorbic            Yes            500mg      Take 500           [facility]

ers



     acid      4-06                               mg by           [facility]



     (VITAMIN C)      13:01:                               mouth           [place]



     500 mg      34                                 daily.           Medical



     tablet                                                        [facility]

 

     Docusate            Yes       [id number] 100mg      Take 100           

[facility]



     Sodium 100      4-06                               mg by           [facility]



     mg tablet      13:01:                               mouth           [place]



               34                                 daily.           Medical



                                                                 [facility]

 

     dutasteride      -0      Yes            .5mg      Take 0.5           [facility]

[facility]



     0.5 mg      4-06                               mg by           [facility]



     capsule      13:01:                               mouth           [place]



               34                                 daily.           Medical



                                                                 [facility]

 

     levocetiriz      -0      Yes            5mg       Take 5 mg           U

[facility]



     ine 5 mg      4-06                               by mouth           [facility]



     tablet      13:01:                               every           [place]



               34                                 evening.           Medical



                                                                 [facility]

 

     montelukast      -0      Yes            10mg      Take 10 mg           

[facility]



     10 mg      4-06                               by mouth.           [facility]



     tablet      13:01:                                              [place]



               34                                                Medical



                                                                 [facility]

 

     insulin      -0      Yes            20U       inject 20           [facility]

rs



     degludec      4-06                               Units           [facility]



     200 unit/mL      13:01:                               under the           [facility]

[facility]



     (3 mL) InPn      34                                 skin.           Medical



                                                  Inject 20           [facility]



                                                  Units           



                                                  Daily           

 

     gabapentin      -0      Yes            300mg      Take 300           [facility]

[facility]



     (NEURONTIN)      4-06                               mg by           [facility]



     300 mg      13:01:                               mouth           [place]



     capsule      34                                 daily.           Medical



                                                                 [facility]

 

     lansoprazol      -0      Yes            15mg      Take 15 mg           

[facility]



     e         4-06                               by mouth 2           [facility]



     (PREVACID)      13:01:                               (two)           [place]



     15 mg      34                                 times           Medical



     capsule                                         daily.           [facility]

 

     ascorbic      -0      Yes            500mg      Take 500           [facility]

ers



     acid      4-06                               mg by           [facility]



     (VITAMIN C)      13:01:                               mouth           [place]



     500 mg      34                                 daily.           Medical



     tablet                                                        [facility]

 

     Docusate      -0      Yes       [id number] 100mg      Take 100           

[facility]



     Sodium 100      4-06                               mg by           [facility]



     mg tablet      13:01:                               mouth           [place]



               34                                 daily.           Medical



                                                                 [facility]

 

     dutasteride      -0      Yes            .5mg      Take 0.5           Un

[facility]



     0.5 mg      4-06                               mg by           [facility]



     capsule      13:01:                               mouth           [place]



               34                                 daily.           Medical



                                                                 [facility]

 

     levocetiriz      -0      Yes            5mg       Take 5 mg           U

[facility]



     ine 5 mg      4-06                               by mouth           [facility]



     tablet      13:01:                               every           [place]



               34                                 evening.           Medical



                                                                 [facility]

 

     montelukast      -0      Yes            10mg      Take 10 mg           

[facility]



     10 mg      4-06                               by mouth.           [facility]



     tablet      13:01:                                              [place]



               34                                                Medical



                                                                 [facility]

 

     insulin      -0      Yes            20U       inject 20           [facility]

rs



     degludec      4-06                               Units           [facility]



     200 unit/mL      13:01:                               under the           [facility]

[facility]



     (3 mL) InPn      34                                 skin.           Medical



                                                  Inject 20           [facility]



                                                  Units           



                                                  Daily           

 

     gabapentin      -0      Yes            300mg      Take 300           [facility]

[facility]



     (NEURONTIN)      4-06                               mg by           [facility]



     300 mg      13:01:                               mouth           [place]



     capsule      34                                 daily.           Medical



                                                                 [facility]

 

     lansoprazol      -0      Yes            15mg      Take 15 mg           

[facility]



     e         4-06                               by mouth 2           [facility]



     (PREVACID)      13:01:                               (two)           [place]



     15 mg      34                                 times           Medical



     capsule                                         daily.           [facility]

 

     ascorbic      -0      Yes            500mg      Take 500           [facility]

[facility]



     acid      4-06                               mg by           [facility]



     (VITAMIN C)      13:01:                               mouth           [place]



     500 mg      34                                 daily.           Medical



     tablet                                                        [facility]

 

     Docusate      0      Yes       [id number] 100mg      Take 100           

[facility]



     Sodium 100      4-06                               mg by           [facility]



     mg tablet      13:01:                               mouth           [place]



               34                                 daily.           Medical



                                                                 [facility]

 

     dutasteride      0      Yes            .5mg      Take 0.5           [facility]

[facility]



     0.5 mg      4-06                               mg by           [facility]



     capsule      13:01:                               mouth           [place]



               34                                 daily.           Medical



                                                                 [facility]

 

     levocetiriz      -0      Yes            5mg       Take 5 mg           U

[facility]



     ine 5 mg      4-06                               by mouth           [facility]



     tablet      13:01:                               every           [place]



               34                                 evening.           Medical



                                                                 [facility]

 

     montelukast      -0      Yes            10mg      Take 10 mg           

[facility]



     10 mg      4-06                               by mouth.           [facility]



     tablet      13:01:                                              [place]



               34                                                Medical



                                                                 [facility]

 

     insulin            Yes            20U       inject 20           [facility]

rs



     degludec      4-06                               Units           [facility]



     200 unit/mL      13:01:                               under the           [facility]

[facility]



     (3 mL) InPn      34                                 skin.           Medical



                                                  Inject 20           [facility]



                                                  Units           



                                                  Daily           

 

     gabapentin            Yes            300mg      Take 300           [facility]

[facility]



     (NEURONTIN)      4-06                               mg by           [facility]



     300 mg      13:01:                               mouth           [place]



     capsule      34                                 daily.           Medical



                                                                 [facility]

 

     lansoprazol            Yes            15mg      Take 15 mg           

[facility]



     e         4-06                               by mouth 2           [facility]



     (PREVACID)      13:01:                               (two)           [place]



     15 mg      34                                 times           Medical



     capsule                                         daily.           [facility]

 

     ascorbic            Yes            500mg      Take 500           [facility]

ers



     acid      4-06                               mg by           [facility]



     (VITAMIN C)      13:01:                               mouth           [place]



     500 mg      34                                 daily.           Medical



     tablet                                                        [facility]

 

     Docusate            Yes       [id number] 100mg      Take 100           

[facility]



     Sodium 100      4-06                               mg by           [facility]



     mg tablet      13:01:                               mouth           [place]



               34                                 daily.           Medical



                                                                 [facility]

 

     dutasteride      0      Yes            .5mg      Take 0.5           [facility]

[facility]



     0.5 mg      4-06                               mg by           [facility]



     capsule      13:01:                               mouth           [place]



               34                                 daily.           Medical



                                                                 [facility]

 

     levocetiriz      -0      Yes            5mg       Take 5 mg           U

[facility]



     ine 5 mg      4-06                               by mouth           [facility]



     tablet      13:01:                               every           [place]



               34                                 evening.           Medical



                                                                 [facility]

 

     montelukast      -0      Yes            10mg      Take 10 mg           

[facility]



     10 mg      4-06                               by mouth.           [facility]



     tablet      13:01:                                              [place]



               34                                                Medical



                                                                 [facility]

 

     insulin      2022-0      Yes            20U       inject 20           [facility]

rs



     degludec      4-06                               Units           [facility]



     200 unit/mL      13:01:                               under the           [facility]

[facility]



     (3 mL) InPn      34                                 skin.           Medical



                                                  Inject 20           [facility]



                                                  Units           



                                                  Daily           

 

     gabapentin            Yes            300mg      Take 300           [facility]

[facility]



     (NEURONTIN)      4-06                               mg by           [facility]



     300 mg      13:01:                               mouth           [place]



     capsule      34                                 daily.           Medical



                                                                 [facility]

 

     lansoprazol            Yes            15mg      Take 15 mg           

[facility]



     e         4-06                               by mouth 2           [facility]



     (PREVACID)      13:01:                               (two)           [place]



     15 mg      34                                 times           Medical



     capsule                                         daily.           [facility]

 

     ascorbic            Yes            500mg      Take 500           [facility]

ers



     acid      4-06                               mg by           [facility]



     (VITAMIN C)      13:01:                               mouth           [place]



     500 mg      34                                 daily.           Medical



     tablet                                                        [facility]

 

     Docusate            Yes       [id number] 100mg      Take 100           

[facility]



     Sodium 100      4-06                               mg by           [facility]



     mg tablet      13:01:                               mouth           [place]



               34                                 daily.           Medical



                                                                 [facility]

 

     dutasteride            Yes            .5mg      Take 0.5           Un

[facility]



     0.5 mg      4-06                               mg by           [facility]



     capsule      13:01:                               mouth           [place]



               34                                 daily.           Medical



                                                                 [facility]

 

     levocetiriz            Yes            5mg       Take 5 mg           U

[facility]



     ine 5 mg      4-06                               by mouth           [facility]



     tablet      13:01:                               every           [place]



               34                                 evening.           Medical



                                                                 [facility]

 

     montelukast      0      Yes            10mg      Take 10 mg           

[facility]



     10 mg      4-06                               by mouth.           [facility]



     tablet      13:01:                                              [place]



               34                                                Medical



                                                                 [facility]

 

     insulin            Yes            20U       inject 20           [facility]

rs



     degludec      4-06                               Units           [facility]



     200 unit/mL      13:01:                               under the           [facility]

[facility]



     (3 mL) InPn      34                                 skin.           Medical



                                                  Inject 20           [facility]



                                                  Units           



                                                  Daily           

 

     ezetimibe      0      Yes       [id number] 10mg      Take 1           U

[facility]



     10 mg      4-06                               tablet by           [facility]



     tablet      00:00:                               mouth           [place]



               00                                 daily.           Medical



                                                                 [facility]

 

     atorvastati      0      Yes       [id number] 80mg      Take 1          

 [facility]



     n 80 mg      4-06                               tablet by           [facility]



     tablet      00:00:                               mouth           [place]



               00                                 daily.           Medical



                                                                 [facility]

 

     carvediloL      0      Yes       [id number] 25mg      Take 1           

[facility]



     25 mg      4-06                               tablet by           [facility]



     tablet      00:00:                               mouth 2           [place]



               00                                 (two)           Medical



                                                  times           [facility]



                                                  daily.           

 

     NIFEdipine      -0      Yes       [id number] 30mg      Take 1           

[facility]



     XL 30 mg 24      4-06                               tablet by           ity

 of



     hr tablet      00:00:                               mouth 2           [place]



               00                                 (two)           Medical



                                                  times           [facility]



                                                  daily.           

 

     ezetimibe      -0      Yes       [id number] 10mg      Take 1           U

[facility]



     10 mg      4-06                               tablet by           [facility]



     tablet      00:00:                               mouth           [place]



               00                                 daily.           Medical



                                                                 [facility]

 

     atorvastati      -0      Yes       [id number] 80mg      Take 1          

 [facility]



     n 80 mg      4-06                               tablet by           [facility]



     tablet      00:00:                               mouth           [place]



               00                                 daily.           Medical



                                                                 [facility]

 

     carvediloL      0      Yes       [id number] 25mg      Take 1           

[facility]



     25 mg      4-06                               tablet by           [facility]



     tablet      00:00:                               mouth 2           [place]



               00                                 (two)           Medical



                                                  times           [facility]



                                                  daily.           

 

     NIFEdipine      -0      Yes       [id number] 30mg      Take 1           

[facility]



     XL 30 mg 24      4-06                               tablet by           ity

 of



     hr tablet      00:00:                               mouth 2           [place]



               00                                 (two)           Medical



                                                  times           [facility]



                                                  daily.           

 

     ezetimibe      -0      Yes       [id number] 10mg      Take 1           U

[facility]



     10 mg      4-06                               tablet by           [facility]



     tablet      00:00:                               mouth           [place]



               00                                 daily.           Medical



                                                                 [facility]

 

     atorvastati      0      Yes       [id number] 80mg      Take 1          

 [facility]



     n 80 mg      4-06                               tablet by           [facility]



     tablet      00:00:                               mouth           [place]



               00                                 daily.           Medical



                                                                 [facility]

 

     carvediloL      -0      Yes       [id number] 25mg      Take 1           

[facility]



     25 mg      4-06                               tablet by           [facility]



     tablet      00:00:                               mouth 2           [place]



               00                                 (two)           Medical



                                                  times           [facility]



                                                  daily.           

 

     NIFEdipine      -0      Yes       [id number] 30mg      Take 1           

[facility]



     XL 30 mg 24      4-06                               tablet by           [facility]

 of



     hr tablet      00:00:                               mouth 2           [place]



               00                                 (two)           [facility]



                                                  times           [facility]



                                                  daily.           

 

     ezetimibe      -0      Yes       [id number] 10mg      Take 1           [facility]

[facility]



     10 mg      4-06                               tablet by           [facility] of



     tablet      00:00:                               mouth           [place]



               00                                 daily.           Medical



                                                                 Branch

 

     atorvastati      -0      Yes       [id number] 80mg      Take 1          

 [facility]



     n 80 mg      4-06                               tablet by           [facility] of



     tablet      00:00:                               mouth           [place]



               00                                 daily.           Medical



                                                                 Branch

 

     carvediloL      -0      Yes       [id number] 25mg      Take 1           

[facility]



     25 mg      4-06                               tablet by           [facility] of



     tablet      00:00:                               mouth 2           [place]



               00                                 (two)           Medical



                                                  times           [facility]



                                                  daily.           

 

     NIFEdipine      -0      Yes       [id number] 30mg      Take 1           

[facility]



     XL 30 mg 24      4-06                               tablet by           [facility]

 of



     hr tablet      00:00:                               mouth 2           [place]



               00                                 (two)           Medical



                                                  times           [facility]



                                                  daily.           

 

     ezetimibe      -0      Yes       [id number] 10mg      Take 1           [facility]

[facility]



     10 mg      4-06                               tablet by           [facility] of



     tablet      00:00:                               mouth           [place]



               00                                 daily.           Medical



                                                                 Branch

 

     atorvastati      -0      Yes       [id number] 80mg      Take 1          

 [facility]



     n 80 mg      4-06                               tablet by           [facility] of



     tablet      00:00:                               mouth           [place]



               00                                 daily.           Medical



                                                                 Branch

 

     carvediloL      -0      Yes       [id number] 25mg      Take 1           

[facility]



     25 mg      4-06                               tablet by           [facility] of



     tablet      00:00:                               mouth 2           [place]



               00                                 (two)           Medical



                                                  times           [facility]



                                                  daily.           

 

     NIFEdipine      -0      Yes       [id number] 30mg      Take 1           

[facility]



     XL 30 mg 24      4-06                               tablet by           [facility]

 of



     hr tablet      00:00:                               mouth 2           [place]



               00                                 (two)           Medical



                                                  times           [facility]



                                                  daily.           

 

     ezetimibe      -0      Yes       [id number] 10mg      Take 1           [facility]

[facility]



     10 mg      4-06                               tablet by           [facility] of



     tablet      00:00:                               mouth           [place]



               00                                 daily.           Medical



                                                                 Branch

 

     atorvastati      0      Yes       [id number] 80mg      Take 1          

 [facility]



     n 80 mg      4-06                               tablet by           [facility] of



     tablet      00:00:                               mouth           [place]



               00                                 daily.           Medical



                                                                 Branch

 

     carvediloL      -0      Yes       [id number] 25mg      Take 1           

[facility]



     25 mg      4-06                               tablet by           [facility] of



     tablet      00:00:                               mouth 2           [place]



               00                                 (two)           Medical



                                                  times           [facility]



                                                  daily.           

 

     NIFEdipine      -0      Yes       [id number] 30mg      Take 1           

[facility]



     XL 30 mg 24      4-06                               tablet by           [facility]

 of



     hr tablet      00:00:                               mouth 2           [place]



               00                                 (two)           Medical



                                                  times           [facility]



                                                  daily.           

 

     ezetimibe      -0      Yes       [id number] 10mg      Take 1           [facility]

[facility]



     10 mg      4-06                               tablet by           [facility] of



     tablet      00:00:                               mouth           [place]



               00                                 daily.           Medical



                                                                 Branch

 

     atorvastati      -0      Yes       [id number] 80mg      Take 1          

 [facility]



     n 80 mg      4-06                               tablet by           [facility] of



     tablet      00:00:                               mouth           [place]



               00                                 daily.           Medical



                                                                 Branch

 

     carvediloL      0      Yes       [id number] 25mg      Take 1           

[facility]



     25 mg      4-06                               tablet by           [facility] of



     tablet      00:00:                               mouth 2           [place]



               00                                 (two)           Medical



                                                  times           [facility]



                                                  daily.           

 

     NIFEdipine      -0      Yes       [id number] 30mg      Take 1           

[facility]



     XL 30 mg 24      4-06                               tablet by           [facility]

 of



     hr tablet      00:00:                               mouth 2           [place]



               00                                 (two)           Medical



                                                  times           [facility]



                                                  daily.           

 

     ezetimibe      0      Yes       [id number] 10mg      Take 1           [facility]

[facility]



     10 mg      4-06                               tablet by           [facility] of



     tablet      00:00:                               mouth           [place]



               00                                 daily.           Medical



                                                                 Branch

 

     atorvastati      0      Yes       [id number] 80mg      Take 1          

 [facility]



     n 80 mg      4-06                               tablet by           [facility] of



     tablet      00:00:                               mouth           [place]



               00                                 daily.           Medical



                                                                 Branch

 

     carvediloL      0      Yes       [id number] 25mg      Take 1           

[facility]



     25 mg      4-06                               tablet by           [facility] of



     tablet      00:00:                               mouth 2           [place]



               00                                 (two)           Medical



                                                  times           [facility]



                                                  daily.           

 

     ezetimibe      0      Yes       [id number] 10mg      Take 1           [facility]

[facility]



     10 mg      4-06                               tablet by           [facility] of



     tablet      00:00:                               mouth           [place]



               00                                 daily.           Medical



                                                                 Branch

 

     atorvastati      0      Yes       [id number] 80mg      Take 1          

 [facility]



     n 80 mg      4-06                               tablet by           [facility] of



     tablet      00:00:                               mouth           [place]



               00                                 daily.           Medical



                                                                 Branch

 

     carvediloL      0      Yes       [id number] 25mg      Take 1           

[facility]



     25 mg      4-06                               tablet by           [facility] of



     tablet      00:00:                               mouth 2           [place]



               00                                 (two)           Medical



                                                  times           [facility]



                                                  daily.           

 

     ezetimibe      0      Yes       [id number] 10mg      Take 1           [facility]

[facility]



     10 mg      4-06                               tablet by           [facility] of



     tablet      00:00:                               mouth           [place]



               00                                 daily.           Medical



                                                                 Branch

 

     atorvastati      0      Yes       [id number] 80mg      Take 1          

 [facility]



     n 80 mg      4-06                               tablet by           [facility] of



     tablet      00:00:                               mouth           [place]



               00                                 daily.           Medical



                                                                 [facility]anch

 

     carvediloL      0      Yes       [id number] 25mg      Take 1           

[facility]



     25 mg      4-06                               tablet by           [facility] of



     tablet      00:00:                               mouth 2           [place]



               00                                 (two)           Medical



                                                  times           [facility]



                                                  daily.           

 

     NIFEdipine      -2022- No        [id number] 30mg      Take 1          

 [facility]



     XL 30 mg 24      4-06 12-07                          tablet by           [facility]

[facility] of



     hr tablet      00:00: 00:00                          mouth 2           [place]

[facility]



               00   :00                           (two)           Medical



                                                  times           [facility]



                                                  daily.           

 

     NIFEdipine      2022- No             30mg      Take 30 mg           

[facility]



     XL 30 mg 24      3-23 04-06                          by mouth           [facility]

 of



     hr tablet      00:00: 00:00                          daily.           [place]



               00   :00                                          Medical



                                                                 Branch

 

     aspirin 81      -0      Yes                      (Prior           [facility]



     MG chewable      3-18                               Auth: Rx           [facility]

th



     tablet      09:54:                               Ref#:[id number]           



               58                                 [id number])           

 

     Fluticasone      -0      Yes                      (Prior           [facility]



     -Umeclidin-      3-18                               Auth: Rx           [facility]

th



     [facility]      09:54:                               Ref#:[id number]           



     ([facility]      58                                 [id number])           



     Ellipta)                                                        



     100-62.5-25                                                        



     MCG/INH                                                        



     aerosol                                                        



     powder                                                        

 

     lansoprazol      -0      Yes            15mg      Take 15 mg           

[facility]



     e         3-18                               by mouth.           [facility]



     (Prevacid)      09:54:                                              



     15 MG DR      58                                                



     capsule                                                        

 

     montelukast      -0      Yes            10mg      Take 10 mg           

[facility]



     (Singulair)      3-18                               by mouth.           [facility]

lth



     10 MG      09:54:                                              



     tablet      58                                                

 

     aspirin 81      -0      Yes                      (Prior           [facility]



     MG chewable      3-18                               Auth: Rx           [facility]

th



     tablet      09:54:                               Ref#:[id number]           



               58                                 [id number])           

 

     Fluticasone      -0      Yes                      (Prior           [facility]



     -Umeclidin-      3-18                               Auth: Rx           [facility]al

th



     [facility]      09:54:                               Ref#:[id number]           



     ([facility]      [facility]                                 [id number])           



     Ellipta)                                                        



     100-62.5-25                                                        



     MCG/INH                                                        



     aerosol                                                        



     powder                                                        

 

     lansoprazol      -0      Yes            15mg      Take 15 mg           

[facility]



     e         3-18                               by mouth.           [facility]



     (Prevacid)      09:54:                                              



     15 MG DR      58                                                



     capsule                                                        

 

     montelukast      -0      Yes            10mg      Take 10 mg           

[facility]



     (Singulair)      3-18                               by mouth.           [facility]

lth



     10 MG      09:54:                                              



     tablet      58                                                

 

     aspirin 81      -0      Yes                      (Prior           [facility]



     MG chewable      3-18                               Auth: Rx           Heal

th



     tablet      09:54:                               Ref#:[id number]           



               58                                 [id number])           

 

     Fluticasone      202-0      Yes                      (Prior           [facility]



     -Umeclidin-      3-18                               Auth: Rx           Heal

th



     [facility]      09:54:                               Ref#:[id number]           



     ([facility]      58                                 [id number])           



     Ellipta)                                                        



     100-62.5-25                                                        



     MCG/INH                                                        



     aerosol                                                        



     powder                                                        

 

     lansoprazol      2022-0      Yes            15mg      Take 15 mg           

[facility]



     e         3-18                               by mouth.           Health



     (Prevacid)      09:54:                                              



     15 MG DR      58                                                



     capsule                                                        

 

     montelukast      2022-0      Yes            10mg      Take 10 mg           

[facility]



     (Singulair)      3-18                               by mouth.           [facility]

lth



     10 MG      09:54:                                              



     tablet      58                                                

 

     aspirin 81      2-0      Yes                      (Prior           [facility]



     MG chewable      3-18                               Auth: Rx           Heal

th



     tablet      09:54:                               Ref#:[id number]           



               58                                 [id number])           

 

     Fluticasone      2022-0      Yes                      (Prior           [facility]



     -Umeclidin-      3-18                               Auth: Rx           Heal

th



     [facility]      09:54:                               Ref#:[id number]           



     ([facility]      58                                 [id number])           



     Ellipta)                                                        



     100-62.5-25                                                        



     MCG/INH                                                        



     aerosol                                                        



     powder                                                        

 

     lansoprazol      2022-0      Yes            15mg      Take 15 mg           

[facility]



     e         3-18                               by mouth.           Health



     (Prevacid)      09:54:                                              



     15 MG DR      58                                                



     capsule                                                        

 

     montelukast      2022-0      Yes            10mg      Take 10 mg           

[facility]



     (Singulair)      3-18                               by mouth.           [facility]a

lth



     10 MG      09:54:                                              



     tablet      58                                                

 

     aspirin 81      2-0      Yes                      (Prior           [facility]



     MG chewable      3-18                               Auth: Rx           Heal

th



     tablet      09:54:                               Ref#:[id number]           



               58                                 [id number])           

 

     Fluticasone      2-0      Yes                      (Prior           [facility]



     -Umeclidin-      3-18                               Auth: Rx           Heal

th



     [facility]      09:54:                               Ref#:[id number]           



     ([facility]      58                                 [id number])           



     Ellipta)                                                        



     100-62.5-25                                                        



     MCG/INH                                                        



     aerosol                                                        



     powder                                                        

 

     lansoprazol      2022-0      Yes            15mg      Take 15 mg           

[facility]



     e         3-18                               by mouth.           Health



     (Prevacid)      09:54:                                              



     15 MG DR      58                                                



     capsule                                                        

 

     montelukast      2022-0      Yes            10mg      Take 10 mg           

[facility]



     (Singulair)      3-18                               by mouth.           Hea

lth



     10 MG      09:54:                                              



     tablet      58                                                

 

     aspirin 81      2-0      Yes                      (Prior           [facility]



     MG chewable      3-18                               Auth: Rx           Heal

th



     tablet      09:54:                               Ref#:[id number]           



               58                                 [id number])           

 

     Fluticasone      2022-0      Yes                      (Prior           [facility]



     -Umeclidin-      3-18                               Auth: Rx           Heal

th



     [facility]      09:54:                               Ref#:[id number]           



     ([facility]      58                                 [id number])           



     Ellipta)                                                        



     100-62.5-25                                                        



     MCG/INH                                                        



     aerosol                                                        



     powder                                                        

 

     lansoprazol      2022-0      Yes            15mg      Take 15 mg           

[facility]



     e         3-18                               by mouth.           Health



     (Prevacid)      09:54:                                              



     15 MG DR      58                                                



     capsule                                                        

 

     montelukast      2022-0      Yes            10mg      Take 10 mg           

[facility]



     (Singulair)      3-18                               by mouth.           Hea

lth



     10 MG      09:54:                                              



     tablet      58                                                

 

     aspirin 81      2-0      Yes                      (Prior           [facility]



     MG chewable      3-18                               Auth: Rx           Heal

th



     tablet      09:54:                               Ref#:[id number]           



               58                                 [id number])           

 

     Fluticasone      2022-0      Yes                      (Prior           [facility]



     -Umeclidin-      3-18                               Auth: Rx           Heal





     [facility]      09:54:                               Ref#:[id number]           



     ([facility]      58                                 [id number])           



     Ellipta)                                                        



     100-62.5-25                                                        



     MCG/INH                                                        



     aerosol                                                        



     powder                                                        

 

     lansoprazol      2022-0      Yes            15mg      Take 15 mg           

[facility]



     e         3-18                               by mouth.           Health



     (Prevacid)      09:54:                                              



     15 MG DR      58                                                



     capsule                                                        

 

     montelukast      2022-0      Yes            10mg      Take 10 mg           

[facility]



     (Singulair)      3-18                               by mouth.           [facility]a

lth



     10 MG      09:54:                                              



     tablet      58                                                

 

     aspirin 81      2-0      Yes                      (Prior           [facility]



     MG chewable      3-18                               Auth: Rx           [facility]

th



     tablet      09:54:                               Ref#:[id number]           



               58                                 [id number])           

 

     Fluticasone      2022-0      Yes                      (Prior           [facility]



     -Umeclidin-      3-18                               Auth: Rx           [facility]





     Vilant      09:54:                               Ref#:[id number]           



     ([facility]      58                                 [id number])           



     Ellipta)                                                        



     100-62.5-25                                                        



     MCG/INH                                                        



     aerosol                                                        



     powder                                                        

 

     lansoprazol      2022-0      Yes            15mg      Take 15 mg           

[facility]



     e         3-18                               by mouth.           Health



     (Prevacid)      09:54:                                              



     15 MG DR      58                                                



     capsule                                                        

 

     montelukast      2022-0      Yes            10mg      Take 10 mg           

[facility]



     (Singulair)      3-18                               by mouth.           [facility]

l[facility]



     10 MG      09:54:                                              



     tablet      58                                                

 

     aspirin 81      2-0      Yes                      (Prior           [facility]



     MG chewable      3-18                               Auth: Rx           [facility]

th



     tablet      09:54:                               Ref#:[id number]           



               58                                 [id number])           

 

     Fluticasone      2022-0      Yes                      (Prior           [facility]



     -Umeclidin-      318                               Auth: Rx           [facility]

th



     Vilant      09:54:                               Ref#:[id number]           



     ([facility]      58                                 [id number])           



     Ellipta)                                                        



     100-62.5-25                                                        



     MCG/INH                                                        



     aerosol                                                        



     powder                                                        

 

     lansoprazol      -0      Yes            15mg      Take 15 mg           

[facility]



     e         3-18                               by mouth.           Health



     (Prevacid)      09:54:                                              



     15 MG DR      58                                                



     capsule                                                        

 

     montelukast      0      Yes            10mg      Take 10 mg           

[facility]



     (Singulair)      3-18                               by mouth.           [facility]

[facility]



     10 MG      09:54:                                              



     tablet      58                                                

 

     aspirin 81      0      Yes                      (Prior           [facility]



     MG chewable      3-18                               Auth: Rx           [facility]

th



     tablet      09:54:                               Ref#:[id number]           



               58                                 [id number])           

 

     Fluticasone      -0      Yes                      (Prior           [facility]



     -Umeclidin-      318                               Auth: Rx           [facility]

th



     Vilant      09:54:                               Ref#:[id number]           



     ([facility]      58                                 [id number])           



     Ellipta)                                                        



     100-62.5-25                                                        



     MCG/INH                                                        



     aerosol                                                        



     powder                                                        

 

     lansoprazol      -0      Yes            15mg      Take 15 mg           

[facility]



     e         3-18                               by mouth.           Health



     (Prevacid)      09:54:                                              



     15 MG DR      58                                                



     capsule                                                        

 

     montelukast      0      Yes            10mg      Take 10 mg           

[facility]



     (Singulair)      3-18                               by mouth.           [facility]

[facility]



     10 MG      09:54:                                              



     tablet      58                                                

 

     Tresiba            Yes                      INJECT 55           [facility]



     FlexTouch      3-11                               UNITS           Health



     200 UNIT/ML      00:00:                               SUBCUTANEO           



     injection      00                                 USLY ONCE           



                                                  DAILY AT           



                                                  THE SAME           



                                                  TIME EACH           



                                                  DAY.           

 

     Tresiba            Yes                      INJECT 55           [facility]



     FlexTouch      3-11                               UNITS           Health



     200 UNIT/ML      00:00:                               SUBCUTANEO           



     injection      00                                 USLY ONCE           



                                                  DAILY AT           



                                                  THE SAME           



                                                  TIME EACH           



                                                  DAY.           

 

     Tresiba      0      Yes                      INJECT 55           [facility]



     FlexTouch      3-11                               UNITS           Health



     200 UNIT/ML      00:00:                               SUBCUTANEO           



     injection      00                                 USLY ONCE           



                                                  DAILY AT           



                                                  THE SAME           



                                                  TIME EACH           



                                                  DAY.           

 

     Tresiba      0      Yes                      INJECT 55           [facility]



     FlexTouch      3-11                               UNITS           Health



     200 UNIT/ML      00:00:                               SUBCUTANEO           



     injection      00                                 USLY ONCE           



                                                  DAILY AT           



                                                  THE SAME           



                                                  TIME EACH           



                                                  DAY.           

 

     Tresiba      0      Yes                      INJECT 55           [facility]



     FlexTouch      3-11                               UNITS           Health



     200 UNIT/ML      00:00:                               SUBCUTANEO           



     injection      00                                 USLY ONCE           



                                                  DAILY AT           



                                                  THE SAME           



                                                  TIME EACH           



                                                  DAY.           

 

     Tresiba            Yes                      INJECT 55           [facility]



     FlexTouch      3-11                               UNITS           Health



     200 UNIT/ML      00:00:                               SUBCUTANEO           



     injection      00                                 USLY ONCE           



                                                  DAILY AT           



                                                  THE SAME           



                                                  TIME EACH           



                                                  DAY.           

 

     Tresiba            Yes                      INJECT 55           [facility]



     FlexTouch      3-11                               UNITS           Health



     200 UNIT/ML      00:00:                               SUBCUTANEO           



     injection      00                                 USLY ONCE           



                                                  DAILY AT           



                                                  THE SAME           



                                                  TIME EACH           



                                                  DAY.           

 

     Tresiba            Yes                      INJECT 55           [facility]



     FlexTouch      3-11                               UNITS           Health



     200 UNIT/ML      00:00:                               SUBCUTANEO           



     injection      00                                 USLY ONCE           



                                                  DAILY AT           



                                                  THE SAME           



                                                  TIME EACH           



                                                  DAY.           

 

     Tresiba            Yes                      INJECT 55           [facility]



     FlexTouch      3-11                               UNITS           Health



     200 UNIT/ML      00:00:                               SUBCUTANEO           



     injection      00                                 USLY ONCE           



                                                  DAILY AT           



                                                  THE SAME           



                                                  TIME EACH           



                                                  DAY.           

 

     Tresiba            Yes                      INJECT 55           [facility]



     FlexTouch      3-11                               UNITS           Health



     200 UNIT/ML      00:00:                               SUBCUTANEO           



     injection      00                                 USLY ONCE           



                                                  DAILY AT           



                                                  THE SAME           



                                                  TIME EACH           



                                                  DAY.           

 

     losartan      0      Yes                                     [facility]



     (Cozaar) 50      1-06                                              Health



     MG tablet      00:00:                                              



               00                                                

 

     losartan      -0      Yes                                     [facility]



     (Cozaar) 50      1-06                                              Health



     MG tablet      00:00:                                              



               00                                                

 

     losartan      -0      Yes                                     [facility]



     (Cozaar) 50      1-06                                              Health



     MG tablet      00:00:                                              



               00                                                

 

     losartan      2-0      Yes                                     [facility]



     (Cozaar) 50      1-06                                              Health



     MG tablet      00:00:                                              



               00                                                

 

     losartan      2-0      Yes                                     [facility]



     (Cozaar) 50      1-06                                              Health



     MG tablet      00:00:                                              



               00                                                

 

     losartan      2-0      Yes                                     [facility]



     (Cozaar) 50      1-06                                              Health



     MG tablet      00:00:                                              



               00                                                

 

     losartan      2-0      Yes                                     [facility]



     (Cozaar) 50      1-06                                              Health



     MG tablet      00:00:                                              



               00                                                

 

     losartan      2-0      Yes                                     [facility]



     (Cozaar) 50      1-06                                              Health



     MG tablet      00:00:                                              



               00                                                

 

     losartan      2-0      Yes                                     [facility]



     (Cozaar) 50      1-06                                              Health



     MG tablet      00:00:                                              



               00                                                

 

     losartan      2-0      Yes                                     [facility]



     (Cozaar) 50      1-06                                              Health



     MG tablet      00:00:                                              



               00                                                

 

     losartan 50      2-0      Yes            50mg      Take 50 mg           

[facility]



     mg tablet      1-06                               by mouth 2           [facility] 

of



               00:00:                               (two)           [place]



               00                                 times           [facility]



                                                  daily.           [facility]

 

     losartan 50      2-0      Yes            50mg      Take 50 mg           

[facility]



     mg tablet      1-06                               by mouth 2           [facility] 

of



               00:00:                               (two)           [place]



               00                                 times           Medical



                                                  daily.           [facility]

 

     losartan 50      2-0      Yes            50mg      Take 50 mg           

[facility]



     mg tablet      1-06                               by mouth 2           [facility] 

of



               00:00:                               (two)           [place]



               00                                 times           Medical



                                                  daily.           [facility]

 

     losartan 50      2-0      Yes            50mg      Take 50 mg           

[facility]



     mg tablet      1-06                               by mouth 2           [facility] 

of



               00:00:                               (two)           [place]



               00                                 times           Medical



                                                  daily.           [facility]

 

     losartan 50      2-0      Yes            50mg      Take 50 mg           

[facility]



     mg tablet      1-06                               by mouth 2           [facility] 

of



               00:00:                               (two)           [place]



               00                                 times           Medical



                                                  daily.           [facility]

 

     losartan 50      2-0      Yes            50mg      Take 50 mg           

[facility]



     mg tablet      1-06                               by mouth 2           [facility] 

of



               00:00:                               (two)           [place]



               00                                 times           [facility]



                                                  daily.           [facility]

 

     losartan 50      2-0      Yes            50mg      Take 50 mg           

[facility]



     mg tablet      1-06                               by mouth 2           [facility] 

of



               00:00:                               (two)           [place]



               00                                 times           Medical



                                                  daily.           [facility]

 

     losartan 50      2-0      Yes            50mg      Take 50 mg           

[facility]



     mg tablet      1-06                               by mouth 2           [facility] 

of



               00:00:                               (two)           [place]



               00                                 times           Medical



                                                  daily.           [facility]

 

     losartan 50      2-0      Yes            50mg      Take 50 mg           

[facility]



     mg tablet      1-06                               by mouth 2           [facility] 

of



               00:00:                               (two)           [place]



               00                                 times           [facility]



                                                  daily.           [facility]

 

     losartan 50      2-0      Yes            50mg      Take 50 mg           

[facility]



     mg tablet      1-06                               by mouth 2           ity 

of



               00:00:                               (two)           [place]



               00                                 times           [facility]



                                                  daily.           [facility]

 

     gabapentin      2-0      Yes                      TAKE ONE           [facility]



     (Neurontin)      1-04                               (1)            Health



     300 MG      00:00:                               CAPSULE(S)           



     capsule      00                                 BY MOUTH           



                                                  TWICE A           



                                                  DAY AT 12           



                                                  HOUR           



                                                  INTERVALS.           

 

     gabapentin      2022-0      Yes                      TAKE ONE           [facility]



     (Neurontin)      1-04                               (1)            Health



     300 MG      00:00:                               CAPSULE(S)           



     capsule      00                                 BY MOUTH           



                                                  TWICE A           



                                                  DAY AT 12           



                                                  HOUR           



                                                  INTERVALS.           

 

     gabapentin      2022-0      Yes                      TAKE ONE           [facility]



     (Neurontin)      1-04                               (1)            [facility]



     300 MG      00:00:                               CAPSULE(S)           



     capsule      00                                 BY MOUTH           



                                                  TWICE A           



                                                  DAY AT 12           



                                                  HOUR           



                                                  INTERVALS.           

 

     gabapentin      2022-0      Yes                      TAKE ONE           [facility]



     (Neurontin)      1-04                               (1)            Health



     300 MG      00:00:                               CAPSULE(S)           



     capsule      00                                 BY MOUTH           



                                                  TWICE A           



                                                  DAY AT 12           



                                                  HOUR           



                                                  INTERVALS.           

 

     gabapentin      -0      Yes                      TAKE ONE           [facility]



     (Neurontin)      1-04                               (1)            Health



     300 MG      00:00:                               CAPSULE(S)           



     capsule      00                                 BY MOUTH           



                                                  TWICE A           



                                                  DAY AT 12           



                                                  HOUR           



                                                  INTERVALS.           

 

     gabapentin      -0      Yes                      TAKE ONE           [facility]



     (Neurontin)      1-04                               (1)            Health



     300 MG      00:00:                               CAPSULE(S)           



     capsule      00                                 BY MOUTH           



                                                  TWICE A           



                                                  DAY AT 12           



                                                  HOUR           



                                                  INTERVALS.           

 

     gabapentin      -0      Yes                      TAKE ONE           [facility]



     (Neurontin)      1-04                               (1)            Health



     300 MG      00:00:                               CAPSULE(S)           



     capsule      00                                 BY MOUTH           



                                                  TWICE A           



                                                  DAY AT 12           



                                                  HOUR           



                                                  INTERVALS.           

 

     gabapentin      -0      Yes                      TAKE ONE           [facility]



     (Neurontin)      1-04                               (1)            Health



     300 MG      00:00:                               CAPSULE(S)           



     capsule      00                                 BY MOUTH           



                                                  TWICE A           



                                                  DAY AT 12           



                                                  HOUR           



                                                  INTERVALS.           

 

     gabapentin      -0      Yes                      TAKE ONE           [facility]



     (Neurontin)      -04                               (1)            Health



     300 MG      00:00:                               CAPSULE(S)           



     capsule      00                                 BY MOUTH           



                                                  TWICE A           



                                                  DAY AT 12           



                                                  HOUR           



                                                  INTERVALS.           

 

     gabapentin      -0      Yes                      TAKE ONE           [facility]



     (Neurontin)      -04                               (1)            Health



     300 MG      00:00:                               CAPSULE(S)           



     capsule      00                                 BY MOUTH           



                                                  TWICE A           



                                                  DAY AT 12           



                                                  HOUR           



                                                  INTERVALS.           

 

     atorvastati      -0      Yes                                     [facility]



     n (Lipitor)      1-                                              Health



     80 MG      00:00:                                              



     tablet      00                                                

 

     carvedilol      -0      Yes                                     [facility]



     (Coreg) 25      1-                                              Health



     MG tablet      00:00:                                              



               00                                                

 

     atorvastati      -0      Yes                                     [facility]



     n (Lipitor)      1-                                              Health



     80 MG      00:00:                                              



     tablet      00                                                

 

     carvedilol      -0      Yes                                     [facility]



     (Coreg) 25      1-                                              Health



     MG tablet      00:00:                                              



               00                                                

 

     atorvastati      -0      Yes                                     [facility]



     n (Lipitor)      1-                                              Health



     80 MG      00:00:                                              



     tablet      00                                                

 

     carvedilol      -0      Yes                                     [facility]



     (Coreg) 25      1-                                              Health



     MG tablet      00:00:                                              



               00                                                

 

     atorvastati      -0      Yes                                     [facility]



     n (Lipitor)      1-                                              Health



     80 MG      00:00:                                              



     tablet      00                                                

 

     carvedilol      -0      Yes                                     [facility]



     (Coreg) 25      1-                                              Health



     MG tablet      00:00:                                              



               00                                                

 

     atorvastati      -0      Yes                                     [facility]



     n (Lipitor)                                                    Health



     80 MG      00:00:                                              



     tablet      00                                                

 

     carvedilol      -0      Yes                                     [facility]



     (Coreg) 25                                                    Health



     MG tablet      00:00:                                              



               00                                                

 

     atorvastati      -0      Yes                                     [facility]



     n (Lipitor)                                                    Health



     80 MG      00:00:                                              



     tablet      00                                                

 

     carvedilol      -0      Yes                                     [facility]



     (Coreg) 25                                                    Health



     MG tablet      00:00:                                              



               00                                                

 

     atorvastati      -0      Yes                                     [facility]



     n (Lipitor)                                                    Health



     80 MG      00:00:                                              



     tablet      00                                                

 

     carvedilol      -0      Yes                                     [facility]



     (Coreg) 25                                                    Health



     MG tablet      00:00:                                              



               00                                                

 

     atorvastati      -0      Yes                                     [facility]



     n (Lipitor)                                                    Health



     80 MG      00:00:                                              



     tablet      00                                                

 

     carvedilol      -0      Yes                                     [facility]



     (Coreg) 25                                                    Health



     MG tablet      00:00:                                              



               00                                                

 

     atorvastati      -0      Yes                                     [facility]



     n (Lipitor)                                                    Health



     80 MG      00:00:                                              



     tablet      00                                                

 

     carvedilol      -0      Yes                                     [facility]



     (Coreg) 25                                                    Health



     MG tablet      00:00:                                              



               00                                                

 

     atorvastati      -0      Yes                                     [facility]



     n (Lipitor)                                                    Health



     80 MG      00:00:                                              



     tablet      00                                                

 

     carvedilol      -0      Yes                                     [facility]



     (Coreg) 25                                                    Health



     MG tablet      00:00:                                              



               00                                                

 

     atorvastati      -0 - No             80mg      Take 80 mg          

 [facility]



     n 80 mg                                by mouth           [facility]



     tablet      00:00: 00:00                          daily.           [place]



               00   :00                                          [facility]



                                                                 [facility]

 

     carvediloL      0 - No             25mg      Take 25 mg           

[facility]



     25 mg                                by mouth 2           [facility]



     tablet      00:00: 00:00                          (two)           [place]



               00   :00                           times           [facility]



                                                  daily.           [facility]

 

     albuterol            Yes       [id number] 2{puff}      Inhale 2       

    [facility]



     90        5-16                               Puffs           [facility]



     mcg/actuati      00:00:                               every 4           [facility]

as



     on inhaler      00                                 (four)           Medical



                                                  hours as           [facility]



                                                  needed for           



                                                  Wheezing           



                                                  or             



                                                  Shortness           



                                                  of Breath.           

 

     albuterol            Yes       [id number] 2{puff}      Inhale 2       

    [facility]



     90        5-16                               Puffs           [facility]



     mcg/actuati      00:00:                               every 4           [facility]

as



     on inhaler      00                                 (four)           Medical



                                                  hours as           [facility]



                                                  needed for           



                                                  Wheezing           



                                                  or             



                                                  Shortness           



                                                  of Breath.           

 

     albuterol            Yes       [id number] 2{puff}      Inhale 2       

    [facility]



     90        5-16                               Puffs           [facility]



     mcg/actuati      00:00:                               every 4           [facility]

as



     on inhaler      00                                 (four)           Medical



                                                  hours as           [facility]



                                                  needed for           



                                                  Wheezing           



                                                  or             



                                                  Shortness           



                                                  of Breath.           

 

     albuterol            Yes       [id number] 2{puff}      Inhale 2       

    [facility]



     90        5-16                               Puffs           [facility]



     mcg/actuati      00:00:                               every 4           [facility]

as



     on inhaler      00                                 (four)           Medical



                                                  hours as           [facility]



                                                  needed for           



                                                  Wheezing           



                                                  or             



                                                  Shortness           



                                                  of Breath.           

 

     albuterol            Yes       [id number] 2{puff}      Inhale 2       

    [facility]



     90        5-16                               Puffs           [facility]



     mcg/actuati      00:00:                               every 4           [facility]

as



     on inhaler      00                                 (four)           Medical



                                                  hours as           [facility]



                                                  needed for           



                                                  Wheezing           



                                                  or             



                                                  Shortness           



                                                  of Breath.           

 

     albuterol            Yes       [id number] 2{puff}      Inhale 2       

    [facility]



     90        5-16                               Puffs           ity [facility]



     mcg/actuati      00:00:                               every 4           [facility]

as



     on inhaler      00                                 (four)           Medical



                                                  hours as           [facility]



                                                  needed for           



                                                  Wheezing           



                                                  or             



                                                  Shortness           



                                                  of Breath.           

 

     albuterol            Yes       [id number] 2{puff}      Inhale 2       

    [facility]



     90        5-16                               Puffs           ity of



     mcg/actuati      00:00:                               every 4           [facility]

as



     on inhaler      00                                 (four)           Medical



                                                  hours as           [facility]



                                                  needed for           



                                                  Wheezing           



                                                  or             



                                                  Shortness           



                                                  of Breath.           

 

     albuterol            Yes       [id number] 2{puff}      Inhale 2       

    [facility]



     90        5-16                               Puffs           [facility]



     mcg/actuati      00:00:                               every 4           [facility]

as



     on inhaler      00                                 (four)           Medical



                                                  hours as           [facility]



                                                  needed for           



                                                  Wheezing           



                                                  or             



                                                  Shortness           



                                                  of Breath.           

 

     albuterol            Yes       [id number] 2{puff}      Inhale 2       

    [facility]



     90        5-16                               Puffs           ity of



     mcg/actuati      00:00:                               every 4           [facility]

as



     on inhaler      00                                 (four)           Medical



                                                  hours as           [facility]



                                                  needed for           



                                                  Wheezing           



                                                  or             



                                                  Shortness           



                                                  of Breath.           

 

     albuterol            Yes       [id number] 2{puff}      Inhale 2       

    [facility]



     90        5-16                               Puffs           [facility] of



     mcg/actuati      00:00:                               every 4           [facility]

as



     on inhaler      00                                 (four)           Medical



                                                  hours as           [facility]



                                                  needed for           



                                                  Wheezing           



                                                  or             



                                                  Shortness           



                                                  of Breath.           

 

     buPROPion      -0      Yes       [id number] 150mg      Take 1           [facility]

[facility]



      mg      4-20                               tablet by           ity o

f



     SR tablet      00:00:                               [facility]            [place]



                                                (two)           Medical



                                                  times           Branch



                                                  daily.           

 

     buPROPion      -0      Yes       [id number] 150mg      Take 1           [facility]

[facility]



      mg      4-20                               tablet by           ity o

f



     SR tablet      00:00:                               [facility] 2           [place]



               00                                 (two)           Medical



                                                  times           Branch



                                                  daily.           

 

     buPROPion      -0      Yes       [id number] 150mg      Take 1           U

nivers



      mg      4-20                               tablet by           ity o

f



     SR tablet      00:00:                               [facility]            [place]



                                                (two)           Medical



                                                  times           Branch



                                                  daily.           

 

     buPROPion      -0      Yes       [id number] 150mg      Take 1           [facility]

n[facility]



      mg      4-20                               tablet by           ity o

f



     SR tablet      00:00:                               [facility]            [place]



                                                (two)           Medical



                                                  times           Branch



                                                  daily.           

 

     buPROPion      -0      Yes       [id number] 150mg      Take 1           [facility]

n[facility]



      mg      4-20                               tablet by           ity o

f



     SR tablet      00:00:                               [facility]            [place]



                                                (two)           Medical



                                                  times           Branch



                                                  daily.           

 

     buPROPion      -0      Yes       [id number] 150mg      Take 1           [facility]

nivers



      mg      4-20                               tablet by           ity o

f



     SR tablet      00:00:                               [facility]            [place]



                                                (two)           Medical



                                                  times           Branch



                                                  daily.           

 

     buPROPion      -0      Yes       [id number] 150mg      Take 1           [facility]

n[facility]



      mg      4-20                               tablet by           ity o

f



     SR tablet      00:00:                               [facility]            [place]



                                                (two)           Medical



                                                  times           Branch



                                                  daily.           

 

     buPROPion      -0      Yes       [id number] 150mg      Take 1           [facility]

n[facility]



      mg      4-20                               tablet by           ity o

f



     SR tablet      00:00:                               [facility]            [place]



                                                (two)           Medical



                                                  times           Branch



                                                  daily.           

 

     buPROPion      2021-0      Yes       [id number] 150mg      Take 1           [facility]

nivers



      mg      4-20                               tablet by           ity o

f



     SR tablet      00:00:                               [facility]            [place]



                                                (two)           Medical



                                                  times           Branch



                                                  daily.           

 

     buPROPion      -0      Yes       [id number] 150mg      Take 1           U

n[facility]



      mg      4-20                               tablet by           ity o

f



     SR tablet      00:00:                               mouth 2           [place]



               00                                 (two)           Medical



                                                  times           Branch



                                                  daily.           

 

     tamsulosin      2021-0      Yes                      Take by           [facility]

ers



     0.4 mg 24      4-01                               mouth           ity of



     hr capsule      13:12:                               daily.           [place]



               40                                 Take 1           Medical



                                                  capsule           Branch



                                                  half hour           



                                                  after PM           



                                                  meal           

 

     tamsulosin      2021-0      Yes                      Take by           [facility]

ers



     0.4 mg 24      4-01                               mouth           ity of



     hr capsule      13:12:                               daily.           [place]



               40                                 Take 1           Medical



                                                  capsule           [facility]anch



                                                  half hour           



                                                  after PM           



                                                  meal           

 

     tamsulosin      2021-0      Yes                      Take by           [facility]

ers



     0.4 mg 24      4-01                               mouth           ity of



     hr capsule      13:12:                               daily.           [place]



               40                                 Take 1           Medical



                                                  capsule           [facility]



                                                  half hour           



                                                  after PM           



                                                  meal           

 

     tamsulosin      2021-0      Yes                      Take by           [facility]

ers



     0.4 mg 24      4-01                               mouth           ity of



     hr capsule      13:12:                               daily.           [place]



               40                                 Take 1           Medical



                                                  capsule           [facility]



                                                  half hour           



                                                  after PM           



                                                  meal           

 

     tamsulosin      2021-0      Yes                      Take by           [facility]

ers



     0.4 mg 24      4-01                               mouth           ity of



     hr capsule      13:12:                               daily.           [place]



               40                                 Take 1           Medical



                                                  capsule           Branch



                                                  half hour           



                                                  after PM           



                                                  meal           

 

     tamsulosin      2021-0      Yes                      Take by           [facility]

ers



     0.4 mg 24      4-01                               mouth           ity of



     hr capsule      13:12:                               daily.           [place]



               40                                 Take 1           Medical



                                                  capsule           [facility]



                                                  half hour           



                                                  after PM           



                                                  meal           

 

     tamsulosin      2021-0      Yes                      Take by           [facility]

ers



     0.4 mg 24      4-01                               mouth           ity of



     hr capsule      13:12:                               daily.           [place]



               40                                 Take 1           Medical



                                                  capsule           [facility]



                                                  half hour           



                                                  after PM           



                                                  meal           

 

     tamsulosin      2021-0      Yes                      Take by           [facility]

ers



     0.4 mg 24      4-01                               mouth           ity of



     hr capsule      13:12:                               daily.           [place]



               40                                 Take 1           Medical



                                                  capsule           Branch



                                                  half hour           



                                                  after PM           



                                                  meal           

 

     tamsulosin      2021-0      Yes                      Take by           [facility]

ers



     0.4 mg 24      4-01                               mouth           [facility]



     hr capsule      13:12:                               daily.           [place]



               40                                 Take 1           [facility]



                                                  capsule           [facility]



                                                  half hour           



                                                  after PM           



                                                  meal           

 

     tamsulosin      2021-0      Yes                      Take by           [facility]

ers



     0.4 mg 24      4-01                               mouth           [facility]



     hr capsule      13:12:                               daily.           [place]



               40                                 Take 1           [facility]



                                                  capsule           [facility]



                                                  half hour           



                                                  after PM           



                                                  meal           

 

     INCONTROL      -      Yes                      USE ONCE           [facility]

ers



     PEN NEEDLE      0-29                               DAILY E           [facility]



     31 gauge x      00:00:                               11.9.           [place]



     316" Ndle      00                                                Medical



                                                                 [facility]

 

     INCONTROL      -      Yes                      USE ONCE           [facility]

ers



     PEN NEEDLE      0-29                               DAILY E           [facility]



     31 gauge x      00:00:                               11.9.           [place]



     3/16" Nd                                                Medical



                                                                 [facility]

 

     INCONTROL      -      Yes                      USE ONCE           [facility]

ers



     PEN NEEDLE      0-29                               DAILY E           [facility]



     31 gauge x      00:00:                               11.9.           [place]



     3/16" Nd                                                Medical



                                                                 [facility]

 

     INCONTROL      -      Yes                      USE ONCE           [facility]

ers



     PEN NEEDLE      0-29                               DAILY E           [facility]



     31 gauge x      00:00:                               11.9.           [place]



     3/16" Nd                                                Medical



                                                                 [facility]

 

     INCONTROL      -      Yes                      USE ONCE           [facility]

ers



     PEN NEEDLE      0-29                               DAILY E           [facility]



     31 gauge x      00:00:                               11.9.           [place]



     3/16" Nd                                                Medical



                                                                 [facility]

 

     INCONTROL      -      Yes                      USE ONCE           [facility]

ers



     PEN NEEDLE      0-29                               DAILY E           [facility]



     31 gauge x      00:00:                               11.9.           [place]



     3/16" Nd                                                Medical



                                                                 [facility]

 

     INCONTROL      -      Yes                      USE ONCE           [facility]

ers



     PEN NEEDLE      0-29                               DAILY E           [facility]



     31 gauge x      00:00:                               11.9.           [place]



     3/16" Nd                                                Medical



                                                                 [facility]

 

     INCONTROL      -      Yes                      USE ONCE           [facility]

ers



     PEN NEEDLE      0-29                               DAILY E           [facility]



     31 gauge x      00:00:                               11.9.           [place]



     3/16" Nd                                                Medical



                                                                 [facility]

 

     INCONTROL      -      Yes                      USE ONCE           [facility]

ers



     PEN NEEDLE      0-29                               DAILY E           [facility]



     31 gauge x      00:00:                               11.9.           [place]



     3/16" Nd                                                Medical



                                                                 [facility]

 

     INCONTROL      -      Yes                      USE ONCE           [facility]

ers



     PEN NEEDLE      0-29                               DAILY E           [facility]



     31 gauge x      00:00:                               11.9.           [place]



     3/16" Ndle                                                      Medical



                                                                 [facility]

 

     tramadol-ac      -      Yes       [id number] 1{tbl}      Take 1         

  [facility]



     etaminophen      6-08                               tablet by           [facility]

 of



     37.5-325 mg      00:00:                               mouth 2           [facility]

[facility]



     per tablet      00                                 (two)           [facility]



                                                  times           [facility]



                                                  daily as           



                                                  needed for           



                                                  Pain.           

 

     tramadol-ac            Yes       [id number] 1{tbl}      Take 1         

  [facility]



     etaminophen      6-08                               tablet by           [facility]

 of



     37.5-325 mg      00:00:                               mouth 2           [facility]

[facility]



     per tablet      00                                 (two)           [facility]



                                                  times           [facility]



                                                  daily as           



                                                  needed for           



                                                  Pain.           

 

     tramadol-ac      2020-0      Yes       [id number] 1{tbl}      Take 1         

  [facility]



     etaminophen      6-08                               tablet by           [facility]

 of



     37.5-325 mg      00:00:                               mouth 2           [facility]

[facility]



     per tablet      00                                 (two)           [facility]



                                                  times           [facility]



                                                  daily as           



                                                  needed for           



                                                  Pain.           

 

     tramadol-ac      0      Yes       [id number] 1{tbl}      Take 1         

  [facility]



     etaminophen      6-08                               tablet by           [facility]

 of



     37.5-325 mg      00:00:                               mouth 2           [facility]

[facility]



     per tablet      00                                 (two)           [facility]



                                                  times           [facility]



                                                  daily as           



                                                  needed for           



                                                  Pain.           

 

     tramadol-ac            Yes       [id number] 1{tbl}      Take 1         

  [facility]



     etaminophen      6-08                               tablet by           [facility]

 of



     37.5-325 mg      00:00:                               mouth 2           [facility]

[facility]



     per tablet      00                                 (two)           [facility]



                                                  times           [facility]



                                                  daily as           



                                                  needed for           



                                                  Pain.           

 

     tramadol-ac            Yes       [id number] 1{tbl}      Take 1         

  [facility]



     etaminophen      6-08                               tablet by           [facility]

 of



     37.5-325 mg      00:00:                               mouth 2           [facility]

[facility]



     per tablet      00                                 (two)           [facility]



                                                  times           [facility]



                                                  daily as           



                                                  needed for           



                                                  Pain.           

 

     tramadol-ac            Yes       [id number] 1{tbl}      Take 1         

  [facility]



     etaminophen      6-08                               tablet by           [facility]

 of



     37.5-325 mg      00:00:                               mouth 2           [facility]

[facility]



     per tablet      00                                 (two)           [facility]



                                                  times           [facility]



                                                  daily as           



                                                  needed for           



                                                  Pain.           

 

     tramadol-ac      0      Yes       [id number] 1{tbl}      Take 1         

  [facility]



     etaminophen      6-08                               tablet by           [facility]

 of



     37.5-325 mg      00:00:                               mouth 2           [facility]

[facility]



     per tablet      00                                 (two)           [facility]



                                                  times           [facility]



                                                  daily as           



                                                  needed for           



                                                  Pain.           

 

     tramadol-ac            Yes       [id number] 1{tbl}      Take 1         

  [facility]



     etaminophen      6-08                               tablet by           [facility]

 of



     37.5-325 mg      00:00:                               mouth 2           [facility]

[facility]



     per tablet      00                                 (two)           [facility]



                                                  times           [facility]



                                                  daily as           



                                                  needed for           



                                                  Pain.           

 

     tramadol-ac            Yes       [id number] 1{tbl}      Take 1         

  [facility]



     etaminophen      6-08                               tablet by           [facility]

 of



     37.5-325 mg      00:00:                               mouth 2           [facility]

[facility]



     per tablet      00                                 (two)           [facility]



                                                  times           [facility]



                                                  daily as           



                                                  needed for           



                                                  Pain.           

 

     nicotine            Yes       [id number] 1{puff}      Inhale 1         

  [facility]



     (NICOTROL)      2-07                               Puff as           [facility]



     10 mg      00:00:                               needed for           [place]



     inhaler      00                                 Smoking           [facility]



                                                  cessation.           [place]

 

     nicotine      2020-0      Yes       [id number] 1{puff}      Inhale 1         

  [facility]



     (NICOTROL)      2-07                               Puff as           [facility]



     10 mg      00:00:                               needed for           [place]



     inhaler      00                                 Smoking           [facility]



                                                  cessation.           [facility]

 

     nicotine      2020-0      Yes       [id number] 1{puff}      Inhale 1         

  [facility]



     (NICOTROL)      2-07                               Puff as           [facility]



     10 mg      00:00:                               needed for           [place]



     inhaler      00                                 Smoking           [facility]



                                                  cessation.           [facility]

 

     nicotine      2020-0      Yes       [id number] 1{puff}      Inhale 1         

  [facility]



     (NICOTROL)      2-07                               Puff as           [facility]



     10 mg      00:00:                               needed for           [place]



     inhaler      00                                 Smoking           [facility]



                                                  cessation.           [place]

 

     nicotine      2020-0      Yes       [id number] 1{puff}      Inhale 1         

  [facility]



     (NICOTROL)      2-07                               Puff as           [facility]



     10 mg      00:00:                               needed for           [place]



     inhaler      00                                 Smoking           [facility]



                                                  cessation.           [place]

 

     nicotine      2020-0      Yes       [id number] 1{puff}      Inhale 1         

  [facility]



     (NICOTROL)      2-07                               Puff as           [facility]



     10 mg      00:00:                               needed for           [place]



     inhaler      00                                 Smoking           [facility]



                                                  cessation.           [place]

 

     nicotine      2020-0      Yes       [id number] 1{puff}      Inhale 1         

  [facility]



     (NICOTROL)      2-07                               Puff as           [facility]



     10 mg      00:00:                               needed for           [place]



     inhaler      00                                 Smoking           [facility]



                                                  cessation.           [place]

 

     nicotine      2020-0      Yes       [id number] 1{puff}      Inhale 1         

  [facility]



     (NICOTROL)      2-07                               Puff as           [facility]



     10 mg      00:00:                               needed for           [place]



     inhaler      00                                 Smoking           [facility]



                                                  cessation.           [facility]

 

     nicotine      2020-0      Yes       [id number] 1{puff}      Inhale 1         

  [facility]



     (NICOTROL)      2-07                               Puff as           [facility]



     10 mg      00:00:                               needed for           [place]



     inhaler      00                                 Smoking           [facility]



                                                  cessation.           [place]

 

     nicotine      2020-0      Yes       [id number] 1{puff}      Inhale 1         

  [facility]



     (NICOTROL)      2-07                               Puff as           [facility]



     10 mg      00:00:                               needed for           [place]



     inhaler      00                                 Smoking           [facility]



                                                  cessation.           [place]

 

     fluticasone      2020-0      Yes            1{puff}      Inhale 1          

 [facility]



     -umeclidin-      1-30                               Puff           [facility]



     vilanter      00:00:                               daily.           [place]



     ([address])                                                        [facility]



     [id number]                                                        



     mcg DsDv                                                        

 

     fluticasone      2020-0      Yes            1{puff}      Inhale 1          

 [facility]



     -umeclidin-      1-30                               Puff           [facility]



     vilanter      00:00:                               daily.           [place]



     ([address])                                                        [facility]



     [id number]                                                        



     mcg DsDv                                                        

 

     fluticasone      2020-0      Yes            1{puff}      Inhale 1          

 [facility]



     -umeclidin-      1-30                               Puff           [facility]



     vilanter      00:00:                               daily.           [place]



     ([address])                                                        [facility]



     [id number]                                                        



     mcg DsDv                                                        

 

     fluticasone      2020-0      Yes            1{puff}      Inhale 1          

 [facility]



     -umeclidin-      1-30                               Puff           [facility]



     vilanter      00:00:                               daily.           [place]



     ([address])                                                        [facility]



     [id number]                                                        



     mcg DsDv                                                        

 

     fluticasone      2020-0      Yes            1{puff}      Inhale 1          

 [facility]



     -umeclidin-      1-30                               Puff           [facility] of



     vilanter      00:00:                               daily.           [place]



     ([address])                                                        [facility]



     100-62.5-25                                                        



     mcg DsDv                                                        

 

     fluticasone      2020-0      Yes            1{puff}      Inhale 1          

 [facility]



     -umeclidin-      1-30                               Puff           [facility]



     vilanter      00:00:                               daily.           [place]



     ([address])                                                        [facility]



     100-62.5-25                                                        



     mcg DsDv                                                        

 

     fluticasone      2020-0      Yes            1{puff}      Inhale 1          

 [facility]



     -umeclidin-      1-30                               Puff           [facility] of



     vilanter      00:00:                               daily.           [place]



     ([address])                                                        [facility]



     100-62.5-25                                                        



     mcg DsDv                                                        

 

     fluticasone      2020-0      Yes            1{puff}      Inhale 1          

 [facility]



     -umeclidin-      1-30                               Puff           [facility]



     vilanter      00:00:                               daily.           [place]



     ([address])                                                        [facility]



     100-62.5-25                                                        



     mcg DsDv                                                        

 

     fluticasone      2020-0      Yes            1{puff}      Inhale 1          

 [facility]



     -umeclidin-      1-30                               Puff           [facility]



     vilanter      00:00:                               daily.           [place]



     [address]                                                        [facility]



     100-62.5-25                                                        



     mcg DsDv                                                        

 

     fluticasone      0      Yes            1{puff}      Inhale 1          

 [facility]



     -umeclidin-      1-30                               Puff           [facility]



     vilanter      00:00:                               daily.           [place]



     (TRELEGY      00                                                Medical



     ELLIPTA)                                                        [facility]



     100-62.5-25                                                        



     mcg DsDv                                                        

 

     tiZANidine      2019      Yes       [id number] 4mg       Take 1           [facility]

[facility]



     4 mg      2-31                               capsule by           [facility]



     capsule      00:00:                               mouth 3           [place]



                                                (three)           [facility]



                                                  times           [facility]



                                                  daily as           



                                                  needed for           



                                                  Muscle           



                                                  Spasms.           

 

     tiZANidine      2019      Yes       [id number] 4mg       Take 1           [facility]

[facility]



     4 mg      2-31                               capsule by           [facility]



     capsule      00:00:                               mouth 3           [place]



                                                (three)           [facility]



                                                  times           [facility]



                                                  daily as           



                                                  needed for           



                                                  Muscle           



                                                  Spasms.           

 

     tiZANidine      2019      Yes       [id number] 4mg       Take 1           [facility]

[facility]



     4 mg      2-31                               capsule by           [facility]



     capsule      00:00:                               mouth 3           [place]



                                                (three)           [facility]



                                                  times           [facility]



                                                  daily as           



                                                  needed for           



                                                  Muscle           



                                                  Spasms.           

 

     tiZANidine      2019      Yes       [id number] 4mg       Take 1           [facility]

[facility]



     4 mg      2-31                               capsule by           [facility]



     capsule      00:00:                               mouth 3           [place]



                                                (three)           [facility]



                                                  times           [facility]



                                                  daily as           



                                                  needed for           



                                                  Muscle           



                                                  Spasms.           

 

     tiZANidine      2019      Yes       [id number] 4mg       Take 1           [facility]

[facility]



     4 mg      2-31                               capsule by           [facility]



     capsule      00:00:                               mouth 3           [place]



                                                (three)           [facility]



                                                  times           [facility]



                                                  daily as           



                                                  needed for           



                                                  Muscle           



                                                  Spasms.           

 

     tiZANidine      2019      Yes       [id number] 4mg       Take 1           U

n[facility]



     4 mg      2-31                               capsule by           [facility]



     capsule      00:00:                               mouth 3           [place]



                                                (three)           [facility]



                                                  times           [facility]



                                                  daily as           



                                                  needed for           



                                                  Muscle           



                                                  Spasms.           

 

     tiZANidine      2019      Yes       [id number] 4mg       Take 1           [facility]

[facility]



     4 mg      2-31                               capsule by           [facility]



     capsule      00:00:                               mouth 3           [place]



                                                (three)           Medical



                                                  times           [facility]



                                                  daily as           



                                                  needed for           



                                                  Muscle           



                                                  Spasms.           

 

     tiZANidine      2019      Yes       [id number] 4mg       Take 1           [facility]

nivers



     4 mg      2-31                               capsule by           [facility]



     capsule      00:00:                               mouth 3           [place]



                                                (three)           Medical



                                                  times           [facility]



                                                  daily as           



                                                  needed for           



                                                  Muscle           



                                                  Spasms.           

 

     tiZANidine      2019      Yes       [id number] 4mg       Take 1           [facility]

nivers



     4 mg      2-31                               capsule by           [facility] of



     capsule      00:00:                               mouth 3           [place]



               00                                 (three)           Medical



                                                  times           [facility]



                                                  daily as           



                                                  needed for           



                                                  Muscle           



                                                  Spasms.           

 

     tiZANidine      2019      Yes       [id number] 4mg       Take 1           [facility]

nivers



     4 mg      2-31                               capsule by           [facility] of



     capsule      00:00:                               mouth 3           [place]



               00                                 (three)           Medical



                                                  times           [facility]



                                                  daily as           



                                                  needed for           



                                                  Muscle           



                                                  Spasms.           

 

     acetaminoph      2019      Yes       [id number] 1{tbl}      Take 1         

  [facility]



     en-codeine      0-29                               tablet by           [facility] 

of



     300-30 mg      00:00:                               mouth           [place]



     tablet      00                                 every 6           Medical



                                                  (six)           [facility]



                                                  hours as           



                                                  needed for           



                                                  Pain           



                                                  (scale           



                                                  4-6).           

 

     acetaminoph      2019      Yes       [id number] 1{tbl}      Take 1         

  [facility]



     en-codeine      0-29                               tablet by           [facility] 

of



     300-30 mg      00:00:                               mouth           [place]



     tablet      00                                 every 6           Medical



                                                  (six)           [facility]



                                                  hours as           



                                                  needed for           



                                                  Pain           



                                                  (scale           



                                                  4-6).           

 

     acetaminoph      2019      Yes       [id number] 1{tbl}      Take 1         

  [facility]



     en-codeine      0-29                               tablet by           [facility] 

of



     300-30 mg      00:00:                               mouth           [place]



     tablet      00                                 every 6           [facility]



                                                  (six)           [facility]



                                                  hours as           



                                                  needed for           



                                                  Pain           



                                                  (scale           



                                                  4-6).           

 

     acetaminoph      2019      Yes       [id number] 1{tbl}      Take 1         

  [facility]



     en-codeine      0-29                               tablet by           [facility] 

of



     300-30 mg      00:00:                               mouth           [place]



     tablet      00                                 every 6           [facility]



                                                  (six)           [facility]



                                                  hours as           



                                                  needed for           



                                                  Pain           



                                                  (scale           



                                                  4-6).           

 

     acetaminoph      2019      Yes       [id number] 1{tbl}      Take 1         

  [facility]



     en-codeine      0-29                               tablet by           [facility] 

of



     300-30 mg      00:00:                               mouth           [place]



     tablet      00                                 every 6           Medical



                                                  (six)           [facility]



                                                  hours as           



                                                  needed for           



                                                  Pain           



                                                  (scale           



                                                  4-6).           

 

     acetaminoph      2019      Yes       [id number] 1{tbl}      Take 1         

  [facility]



     en-codeine      0-29                               tablet by           [facility] 

of



     300-30 mg      00:00:                               mouth           [place]



     tablet      00                                 every 6           [facility]



                                                  (six)           [facility]



                                                  hours as           



                                                  needed for           



                                                  Pain           



                                                  (scale           



                                                  4-6).           

 

     acetaminoph      2019      Yes       [id number] 1{tbl}      Take 1         

  [facility]



     en-codeine      0-29                               tablet by           [facility] 

of



     300-30 mg      00:00:                               mouth           [place]



     tablet      00                                 every 6           [facility]



                                                  (six)           [facility]



                                                  hours as           



                                                  needed for           



                                                  Pain           



                                                  (scale           



                                                  4-6).           

 

     acetaminoph      2019      Yes       [id number] 1{tbl}      Take 1         

  [facility]



     en-codeine      0-29                               tablet by           [facility] 

of



     300-30 mg      00:00:                               mouth           [place]



     tablet      00                                 every 6           [facility]



                                                  (six)           [facility]



                                                  hours as           



                                                  needed for           



                                                  Pain           



                                                  (scale           



                                                  4-6).           

 

     acetaminoph      2019      Yes       [id number] 1{tbl}      Take 1         

  [facility]



     en-codeine      0-29                               tablet by           [facility] 

of



     300-30 mg      00:00:                               mouth           [place]



     tablet      00                                 every 6           [facility]



                                                  (six)           [facility]



                                                  hours as           



                                                  needed for           



                                                  Pain           



                                                  (scale           



                                                  4-6).           

 

     acetaminoph      2019      Yes       [id number] 1{tbl}      Take 1         

  [facility]



     en-codeine      0-29                               tablet by           [facility] 

of



     300-30 mg      00:00:                               mouth           [place]



     tablet      00                                 every 6           [facility]



                                                  (six)           [facility]



                                                  hours as           



                                                  needed for           



                                                  Pain           



                                                  (scale           



                                                  4-6).           

 

     hydrocodone            Yes       [id number] 5mL       Take 5 mL        

   [facility]



     -chlorpheni      3-06                               by mouth           [facility] 

of



     ramine      00:00:                               every 12           [place]



     (TUSSIONEX      00                                 (twelve)           Medic

al



     PENNKINETIC                                         hours as           [facility]

ch



     ER) 10-8                                         needed for           



     mg/5 mL                                         Cough.           



     suspension                                                        

 

     hydrocodone            Yes       [id number] 5mL       Take 5 mL        

   [facility]



     -chlorpheni      3-06                               by mouth           [facility] 

of



     ramine      00:00:                               every 12           [place]



     (TUSSIONEX      00                                 (twelve)           Medic

al



     PENNKINETIC                                         hours as           [facility]

ch



     ER) 10-8                                         needed for           



     mg/5 mL                                         Cough.           



     suspension                                                        

 

     hydrocodone            Yes       [id number] 5mL       Take 5 mL        

   [facility]



     -chlorpheni      3-06                               by mouth           [facility] 

of



     ramine      00:00:                               every 12           [place]



     (TUSSIONEX      00                                 (twelve)           Medic

al



     PENNKINETIC                                         hours as           [facility]

ch



     ER) 10-8                                         needed for           



     mg/5 mL                                         Cough.           



     suspension                                                        

 

     hydrocodone            Yes       [id number] 5mL       Take 5 mL        

   [facility]



     -chlorpheni      3-06                               by mouth           [facility] 

of



     ramine      00:00:                               every 12           [place]



     (TUSSIONEX      00                                 (twelve)           Medic

al



     PENNKINETIC                                         hours as           [facility]

ch



     ER) 10-8                                         needed for           



     mg/5 mL                                         Cough.           



     suspension                                                        

 

     hydrocodone      -      Yes       [id number] 5mL       Take 5 mL        

   [facility]



     -chlorpheni      3-06                               by mouth           [facility] 

of



     ramine      00:00:                               every 12           [place]



     (TUSSIONEX                                       (twelve)           Medic

al



     PENNKINETIC                                         hours as           [facility]

ch



     ER) 10-8                                         needed for           



     mg/5 mL                                         Cough.           



     suspension                                                        

 

     hydrocodone      2019-0      Yes       [id number] 5mL       Take 5 mL        

   [facility]



     -chlorpheni      3-06                               by mouth           [facility] 

of



     ramine      00:00:                               every 12           [place]



     (TUSSIONEX                                       (twelve)           Medic

al



     PENNKINETIC                                         hours as           [facility]

ch



     ER) 10-8                                         needed for           



     mg/5 mL                                         Cough.           



     suspension                                                        

 

     hydrocodone      2019-0      Yes       [id number] 5mL       Take 5 mL        

   [facility]



     -chlorpheni      3-06                               by mouth           [facility] 

of



     ramine      00:00:                               every 12           [place]



     (TUSSIONEX                                       (twelve)           Medic

al



     PENNKINETIC                                         hours as           [facility]

ch



     ER) 10-8                                         needed for           



     mg/5 mL                                         Cough.           



     suspension                                                        

 

     hydrocodone      2019-0      Yes       [id number] 5mL       Take 5 mL        

   [facility]



     -chlorpheni      3-06                               by mouth           [facility] 

of



     ramine      00:00:                               every 12           [place]



     (TUSSIONEX                                       (twelve)           Medic

al



     PENNKINETIC                                         hours as           [facility]

ch



     ER) 10-8                                         needed for           



     mg/5 mL                                         Cough.           



     suspension                                                        

 

     hydrocodone      2019-0      Yes       [id number] 5mL       Take 5 mL        

   [facility]



     -chlorpheni      3-06                               by mouth           [facility] 

of



     ramine      00:00:                               every 12           [place]



     (TUSSIONEX                                       (twelve)           Medic

al



     PENNKINETIC                                         hours as           [facility]

ch



     ER) 10-8                                         needed for           



     mg/5 mL                                         Cough.           



     suspension                                                        

 

     hydrocodone      2019-0      Yes       [id number] 5mL       Take 5 mL        

   [facility]



     -chlorpheni      3-06                               by mouth           [facility] 

of



     ramine      00:00:                               every 12           [place]



     (TUSSIONEX                                       (twelve)           Medic

al



     PENNKINETIC                                         hours as           [facility]

ch



     ER) 10-8                                         needed for           



     mg/5 mL                                         Cough.           



     suspension                                                        

 

     Diclofenac      2018      Yes                      Apply to           [facility]

[facility]



     Sodium      1-26                               area(s) 2           [facility]



     (VOLTAREN)      00:00:                               (two)           [place]



     1 % gel      00                                 times           Medical



                                                  daily.           [facility]

 

     Diclofenac      2018      Yes                      Apply to           [facility]

vers



     Sodium      1-26                               area(s) 2           [facility] of



     (VOLTAREN)      00:00:                               (two)           [place]



     1 % gel      00                                 times           Medical



                                                  daily.           [facility]

 

     Diclofenac      2018      Yes                      Apply to           [facility]

vers



     Sodium      1-26                               area(s) 2           [facility] of



     (VOLTAREN)      00:00:                               (two)           [place]



     1 % gel      00                                 times           [facility]



                                                  daily.           [facility]

 

     Diclofenac      2018      Yes                      Apply to           [facility]

vers



     Sodium      1-26                               area(s) 2           [facility] of



     (VOLTAREN)      00:00:                               (two)           [place]



     1 % gel      00                                 times           Medical



                                                  daily.           [facility]

 

     Diclofenac      2018      Yes                      Apply to           [facility]

vers



     Sodium      1-26                               area(s) 2           [facility] of



     (VOLTAREN)      00:00:                               (two)           [place]



     1 % gel      00                                 times           Medical



                                                  daily.           [facility]

 

     Diclofenac      2018      Yes                      Apply to           [facility]

vers



     Sodium      1-26                               area(s) 2           [facility] of



     (VOLTAREN)      00:00:                               (two)           [place]



     1 % gel      00                                 times           Medical



                                                  daily.           [facility]

 

     Diclofenac      2018      Yes                      Apply to           [facility]

vers



     Sodium      1-26                               area(s) 2           [facility] of



     (VOLTAREN)      00:00:                               (two)           [place]



     1 % gel      00                                 times           [facility]



                                                  daily.           [facility]

 

     Diclofenac      2018      Yes                      Apply to           [facility]

vers



     Sodium      1-26                               area(s) 2           [facility] of



     (VOLTAREN)      00:00:                               (two)           [place]



     1 % gel      00                                 times           Medical



                                                  daily.           [facility]

 

     Diclofenac      2018      Yes                      Apply to           [facility]

vers



     Sodium      1-26                               area(s) 2           [facility] of



     (VOLTAREN)      00:00:                               (two)           [place]



     1 % gel      00                                 times           Medical



                                                  daily.           [facility]

 

     Diclofenac      2018      Yes                      Apply to           [facility]

vers



     Sodium      1-26                               area(s) 2           [facility] of



     (VOLTAREN)      00:00:                               (two)           [place]



     1 % gel      00                                 times           Medical



                                                  daily.           [facility]

 

     HYDROcodone      2018      Yes            1{tbl}      Take 1           [facility]

[facility]



     -acetaminop      0-08                               tablet by           [facility]

 of



     hen       00:00:                               mouth           [place]



     mg tablet      00                                 every 6           [facility]



                                                  (six)           [facility]



                                                  hours as           



                                                  needed for           



                                                  Pain           



                                                  (scale           



                                                  4-6).           

 

     HYDROcodone      2018      Yes            1{tbl}      Take 1           [facility]

[facility]



     -acetaminop      0-08                               tablet by           [facility]

 of



     hen       00:00:                               mouth           [place]



     mg tablet      00                                 every 6           [facility]



                                                  (six)           [facility]



                                                  hours as           



                                                  needed for           



                                                  Pain           



                                                  (scale           



                                                  4-6).           

 

     HYDROcodone      2018      Yes            1{tbl}      Take 1           [facility]

[facility]



     -acetaminop      0-08                               tablet by           [facility]

 of



     hen       00:00:                               mouth           [place]



     mg tablet      00                                 every 6           [facility]



                                                  (six)           [facility]



                                                  hours as           



                                                  needed for           



                                                  Pain           



                                                  (scale           



                                                  4-6).           

 

     HYDROcodone      2018      Yes            1{tbl}      Take 1           [facility]

[facility]



     -acetaminop      0-08                               tablet by           [facility]

 of



     hen       00:00:                               mouth           [place]



     mg tablet      00                                 every 6           Medical



                                                  (six)           Branch



                                                  hours as           



                                                  needed for           



                                                  Pain           



                                                  (scale           



                                                  4-6).           

 

     HYDROcodone      2018      Yes            1{tbl}      Take 1           Un

[facility]



     -acetaminop      0-08                               tablet by           [facility]

 of



     hen       00:00:                               mouth           [place]



     mg tablet      00                                 every 6           Medical



                                                  (six)           Branch



                                                  hours as           



                                                  needed for           



                                                  Pain           



                                                  (scale           



                                                  4-6).           

 

     HYDROcodone      2018      Yes            1{tbl}      Take 1           Un

[facility]



     -acetaminop      0-08                               tablet by           [facility]

 of



     hen       00:00:                               mouth           [place]



     mg tablet      00                                 every 6           Medical



                                                  (six)           Branch



                                                  hours as           



                                                  needed for           



                                                  Pain           



                                                  (scale           



                                                  4-6).           

 

     HYDROcodone      2018      Yes            1{tbl}      Take 1           Un

[facility]



     -acetaminop      0-08                               tablet by           [facility]

 of



     hen       00:00:                               mouth           [place]



     mg tablet      00                                 every 6           Medical



                                                  (six)           Branch



                                                  hours as           



                                                  needed for           



                                                  Pain           



                                                  (scale           



                                                  4-6).           

 

     HYDROcodone      2018      Yes            1{tbl}      Take 1           Un

[facility]



     -acetaminop      0-08                               tablet by           [facility]

 of



     hen       00:00:                               mouth           [place]



     mg tablet      00                                 every 6           Medical



                                                  (six)           Branch



                                                  hours as           



                                                  needed for           



                                                  Pain           



                                                  (scale           



                                                  4-6).           

 

     HYDROcodone      2018      Yes            1{tbl}      Take 1           Un

[facility]



     -acetaminop      0-08                               tablet by           [facility]

 of



     hen       00:00:                               mouth           [place]



     mg tablet      00                                 every 6           Medical



                                                  (six)           Branch



                                                  hours as           



                                                  needed for           



                                                  Pain           



                                                  (scale           



                                                  4-6).           

 

     HYDROcodone      2018      Yes            1{tbl}      Take 1           Un

[facility]



     -acetaminop      0-08                               tablet by           [facility]

 of



     hen       00:00:                               mouth           [place]



     mg tablet      00                                 every 6           Medical



                                                  (six)           Branch



                                                  hours as           



                                                  needed for           



                                                  Pain           



                                                  (scale           



                                                  4-6).           

 

     aspirin 81      2018-0      Yes            81mg      Take 1           [facility]

[facility]



     mg chewable      8-23                               tablet by           [facility]

 of



     tablet      00:00:                               mouth           [place]



               00                                 daily.           Medical



                                                                 Branch

 

     aspirin 81      2018-0      Yes            81mg      Take 1           [facility]

[facility]



     mg chewable      8-23                               tablet by           [facility]

 of



     tablet      00:00:                               mouth           [place]



               00                                 daily.           Medical



                                                                 Branch

 

     aspirin 81      2018-0      Yes            81mg      Take 1           [facility]

rs



     mg chewable      8-23                               tablet by           ity

 of



     tablet      00:00:                               mouth           [place]



               00                                 daily.           Medical



                                                                 Branch

 

     aspirin 81      2018-0      Yes            81mg      Take 1           [facility]

rs



     mg chewable      8-23                               tablet by           [facility]

 of



     tablet      00:00:                               mouth           [place]



               00                                 daily.           Medical



                                                                 Branch

 

     aspirin 81      2018-0      Yes            81mg      Take 1           [facility]

rs



     mg chewable      8-23                               tablet by           ity

 of



     tablet      00:00:                               mouth           [place]



               00                                 daily.           Medical



                                                                 Branch

 

     aspirin 81      2018-0      Yes            81mg      Take 1           [facility]

rs



     mg chewable      8-23                               tablet by           ity

 of



     tablet      00:00:                               mouth           [place]



               00                                 daily.           Medical



                                                                 Branch

 

     aspirin 81      2018-0      Yes            81mg      Take 1           [facility]

rs



     mg chewable      8-23                               tablet by           ity

 of



     tablet      00:00:                               mouth           [place]



               00                                 daily.           Medical



                                                                 Branch

 

     aspirin 81      2018-0      Yes            81mg      Take 1           [facility]

rs



     mg chewable      8-23                               tablet by           [facility]

 of



     tablet      00:00:                               mouth           [place]



               00                                 daily.           Medical



                                                                 Branch

 

     aspirin 81      2018-0      Yes            81mg      Take 1           [facility]

rs



     mg chewable      8-23                               tablet by           [facility]

 of



     tablet      00:00:                               mouth           [place]



               00                                 daily.           Medical



                                                                 Branch

 

     aspirin 81      2018-0      Yes            81mg      Take 1           [facility]

rs



     mg chewable      8-23                               tablet by           ity

 of



     tablet      00:00:                               mouth           [place]



               00                                 daily.           Medical



                                                                 Branch

 

     nitroglycer      2018-0      Yes            .4mg      Place 1           [facility]

[facility]



     in 0.4 mg      8-22                               tablet           [facility] of



     sublingual      00:00:                               under the           [place]



     tablet      00                                 tongue           Medical



                                                  every 5           Branch



                                                  (five)           



                                                  minutes as           



                                                  needed for           



                                                  Chest           



                                                  pain.           



                                                  Maximum of           



                                                  3 tablets           



                                                  in 15           



                                                  minutes           

 

     nitroglycer      2018-0      Yes            .4mg      Place 1           [facility]

vers



     in 0.4 mg      8-22                               tablet           [facility] of



     sublingual      00:00:                               under the           [facility]

[place]



     tablet      00                                 tongue           Medical



                                                  every 5           Branch



                                                  (five)           



                                                  minutes as           



                                                  needed for           



                                                  Chest           



                                                  pain.           



                                                  Maximum of           



                                                  3 tablets           



                                                  in 15           



                                                  minutes           

 

     nitroglycer      2018-0      Yes            .4mg      Place 1           [facility]

[facility]



     in 0.4 mg      8-22                               tablet           ity of



     sublingual      00:00:                               under the           [facility]

[place]



     tablet      00                                 tongue           Medical



                                                  every 5           [facility]



                                                  (five)           



                                                  minutes as           



                                                  needed for           



                                                  Chest           



                                                  pain.           



                                                  Maximum of           



                                                  3 tablets           



                                                  in 15           



                                                  minutes           

 

     nitroglycer      2018-0      Yes            .4mg      Place 1           [facility]

vers



     in 0.4 mg      8-22                               tablet           ity of



     sublingual      00:00:                               under the           [facility]

[place]



     tablet      00                                 tongue           Medical



                                                  every 5           [facility]



                                                  (five)           



                                                  minutes as           



                                                  needed for           



                                                  Chest           



                                                  pain.           



                                                  Maximum of           



                                                  3 tablets           



                                                  in 15           



                                                  minutes           

 

     nitroglycer      2018-0      Yes            .4mg      Place 1           [facility]

vers



     in 0.4 mg      8-22                               tablet           ity of



     sublingual      00:00:                               under the           [facility]

[place]



     tablet      00                                 tongue           Medical



                                                  every 5           [facility]



                                                  (five)           



                                                  minutes as           



                                                  needed for           



                                                  Chest           



                                                  pain.           



                                                  Maximum of           



                                                  3 tablets           



                                                  in 15           



                                                  minutes           

 

     nitroglycer      2018-0      Yes            .4mg      Place 1           Un[facility]

vers



     in 0.4 mg      8-22                               tablet           ity of



     sublingual      00:00:                               under the           [facility]

[place]



     tablet      00                                 tongue           Medical



                                                  every 5           [facility]



                                                  (five)           



                                                  minutes as           



                                                  needed for           



                                                  Chest           



                                                  pain.           



                                                  Maximum of           



                                                  3 tablets           



                                                  in 15           



                                                  minutes           

 

     nitroglycer      2018-0      Yes            .4mg      Place 1           [facility]

vers



     in 0.4 mg      8-22                               tablet           ity of



     sublingual      00:00:                               under the           [facility]

[place]



     tablet      00                                 tongue           Medical



                                                  every 5           [facility]



                                                  (five)           



                                                  minutes as           



                                                  needed for           



                                                  Chest           



                                                  pain.           



                                                  Maximum of           



                                                  3 tablets           



                                                  in 15           



                                                  minutes           

 

     nitroglycer      2018-0      Yes            .4mg      Place 1           [facility]

vers



     in 0.4 mg      8-22                               tablet           [facility] of



     sublingual      00:00:                               under the           [facility]

[place]



     tablet      00                                 tongue           Medical



                                                  every 5           [facility]



                                                  (five)           



                                                  minutes as           



                                                  needed for           



                                                  Chest           



                                                  pain.           



                                                  Maximum of           



                                                  3 tablets           



                                                  in 15           



                                                  minutes           

 

     nitroglycer      2018-0      Yes            .4mg      Place 1           [facility]

vers



     in 0.4 mg      8-22                               tablet           ity of



     sublingual      00:00:                               under the           [facility]

[place]



     tablet      00                                 tongue           Medical



                                                  every 5           [facility]



                                                  (five)           



                                                  minutes as           



                                                  needed for           



                                                  Chest           



                                                  pain.           



                                                  Maximum of           



                                                  3 tablets           



                                                  in 15           



                                                  minutes           

 

     nitroglycer      2018-0      Yes            .4mg      Place 1           [facility]

vers



     in 0.4 mg      8-22                               tablet           ity of



     sublingual      00:00:                               under the           [facility]

[place]



     tablet      00                                 tongue           Medical



                                                  every 5           [facility]



                                                  (five)           



                                                  minutes as           



                                                  needed for           



                                                  Chest           



                                                  pain.           



                                                  Maximum of           



                                                  3 tablets           



                                                  in 15           



                                                  minutes           

 

     fluticasone      -      Yes            2{spray      Use 2           [facility]

[facility]



     50        7-05                     }         Sprays in           [facility] of



     mcg/actuati      00:00:                               each           [place]



     on nasal      00                                 nostril           Medical



     spray                                         daily.           Branch

 

     fluticasone      -      Yes            2{spray      Use 2           Un

[facility]



     50        7-05                     }         Sprays in           ity of



     mcg/actuati      00:00:                               each           [place]



     on nasal      00                                 nostril           Medical



     spray                                         daily.           Branch

 

     fluticasone      -      Yes            2{spray      Use 2           Un

[facility]



     50        7-05                     }         Sprays in           ity of



     mcg/actuati      00:00:                               each           [place]



     on nasal      00                                 nostril           Medical



     spray                                         daily.           Branch

 

     fluticasone      2018-0      Yes            2{spray      Use 2           Un

[facility]



     50        7-05                     }         Sprays in           ity of



     mcg/actuati      00:00:                               each           [place]



     on nasal      00                                 nostril           Medical



     spray                                         daily.           Branch

 

     fluticasone      2018-0      Yes            2{spray      Use 2           Un

[facility]



     50        7-05                     }         Sprays in           ity of



     mcg/actuati      00:00:                               each           [place]



     on nasal      00                                 nostril           Medical



     spray                                         daily.           Branch

 

     fluticasone      -0      Yes            2{spray      Use 2           Un

[facility]



     50        7-05                     }         Sprays in           ity of



     mcg/actuati      00:00:                               each           [place]



     on nasal      00                                 nostril           Medical



     spray                                         daily.           Branch

 

     fluticasone      -0      Yes            2{spray      Use 2           Un

[facility]



     50        7-05                     }         Sprays in           ity of



     mcg/actuati      00:00:                               each           [place]



     on nasal      00                                 nostril           Medical



     spray                                         daily.           Branch

 

     fluticasone      -0      Yes            2{spray      Use 2           Un

[facility]



     50        7-05                     }         Sprays in           ity of



     mcg/actuati      00:00:                               each           [place]



     on nasal      00                                 nostril           Medical



     spray                                         daily.           Branch

 

     fluticasone      2018-0      Yes            2{spray      Use 2           Un

[facility]



     50        7-05                     }         Sprays in           ity of



     mcg/actuati      00:00:                               each           [place]



     on nasal      00                                 nostril           Medical



     spray                                         daily.           Branch

 

     fluticasone      2018-0      Yes            2{spray      Use 2           Un

[facility]



     50        7-05                     }         Sprays in           ity of



     mcg/actuati      00:00:                               each           [place]



     on nasal      00                                 nostril           Medical



     spray                                         daily.           [facility]

 

     Metoprolol Metoprolol           Yes  [facility]                (Prior          

 Common



     Tartrate Tartrate                [place]                Auth: Rx           S

pirit



                                                  Ref#:[id number]           - [facility]



                                                  [id number])           [facility]

 

     Tramadol-Ac Tramadol-Ac           Yes  [place]                (Schedule     

      Common



     etaminophen etaminophen                Crain                IV Drug)      

     [facility]



                                                  (Prior           - [facility]



                                                  Auth: Rx           St



                                                  Ref#:[id number]           [facility]



                                                  [id number])           [facility]

 

     Chantix Chantix           Yes  Danielito                (Prior           Commo

n



                              Crain                Auth: Rx           [facility]



                                                  Ref#:[id number]           [facility]



                                                  [id number])           [facility]

 

     Gabapentin Gabapentin           Yes  Danielito                (Prior          

 Common



                              Crain                Auth: Rx           [facility]



                                                  Ref#:[id number]           - [facility]



                                                  [id number])           [facility]

 

     Aspirin Aspirin           Yes  Danielito                (Prior           Commo

n



                              Crain                Auth: Rx           [facility]



                                                  Ref#:[id number]           - [facility]



                                                  [id number])           [facility]

 

     Carvedilol Carvedilol           Yes  Danielito                (Prior          

 Common



                              Crain                Auth: Rx           [facility]



                                                  Ref#:[id number]           - [facility]



                                                  [id number])           [facility]

 

     ProAir HFA ProAir HFA           Yes  Danielito                (Prior          

 Common



                              Crain                Auth: Rx           [facility]



                                                  Ref#:[id number]           - [facility]



                                                  [id number])           [facility]

 

     Sulfamethox Sulfamethox           Yes  Danielito                (Prior        

   Common



     azole-Trime azole-Trime                Crain                Auth: Rx      

     [facility]



     thoprim thoprim                                    Ref#:[id number]           - [facility]



                                                  [id number])           [facility]

 

     Nitroglycer Nitroglycer           Yes  Danielito                (Prior        

   Common



     in   in                  Crain                Auth: Rx           [facility]



                                                  Ref#:[id number]           - [facility]



                                                  [id number])           [facility]

 

     Trelegy Trelegy           Yes  Danielito                (Prior           Commo

n



     Ellipta Ellipta                Crain                Auth: Rx           Spi

rit



                                                  Ref#:[id number]           - [facility]



                                                  [id number])           [facility]

 

     Spiriva Spiriva           Yes  Danielito                (Prior           Commo

n



     HandiHaler HandiHaler                Crain                Auth: Rx        

   [facility]irit



                                                  Ref#:[id number]           - [facility]



                                                  [id number])           [facility]

 

     Atorvastati Atorvastati           Yes  Danielito                (Prior        

   Common



     n Calcium n Calcium                Crain                Auth: Rx          

 [facility]



                                                  Ref#:[id number]           - [facility]



                                                  [id number])           [facility]

 

     Brilinta Brilinta           Yes  Danielito                (Prior           Com

mon



                              Crain                Auth: Rx           [facility]



                                                  Ref#:[id number]           - [facility]



                                                  [id number])           [facility]

 

     Losartan Losartan           Yes  Danielito                (Prior           Com

mon



     Potassium Potassium                Crain                Auth: Rx          

 [facility]



                                                  Ref#:[id number]           - [facility]



                                                  [id number])           [facility]







Immunizations







           Ordered    Filled Immunization Date       Status     Comments   [facility]

e



           Immunization Name Name                                        

 

           SARS-CoV-2COVID-19m            2021-12-10 Completed             [facility]

[facility]



           [id number]<            00:00:00                         



           sup>1</sup>                                             

 

           SARS-CoV-2COVID-19m            2021 Completed             [facility]

[facility]



           [id number]            00:00:00                         

 

           SARS-COV-2 COVID-19            2021 Completed             Un[facility]

rsity of



           MODERNA VACCINE            00:00:00                         [place] [facility]

[facility]



                                                                  Branch

 

           SARS-COV-2 COVID-19            2021 Completed             Unive

rsity of



           MODERNA VACCINE            00:00:00                         [place] [facility]

[facility]



                                                                  Branch

 

           SARS-COV-2 COVID-19            2021 Completed             Unive

rsity of



           MODERNA 12+ YRS            00:00:00                         [place] [facility]

ical



           VACCINE                                                Branch

 

           SARS-COV-2 COVID-19            2021 Completed             Unive

rsity of



           MODERNA 12+ YRS            00:00:00                         [place] [facility]

ical



           VACCINE                                                Branch

 

           SARS-COV-2 COVID-19            2021 Completed             Unive

rsity of



           MODERNA 12+ YRS            00:00:00                         [place] [facility]

ical



           VACCINE                                                Branch

 

           SARS-COV-2 COVID-19            2021 Completed             Unive

rsity of



           MODERNA 12+ YRS            00:00:00                         [place] [facility]

ical



           VACCINE                                                Branch

 

           SARS-COV-2 COVID-19            2021 Completed             Unive

rsity of



           MODERNA 12+ YRS            00:00:00                         [place] [facility]

ical



           VACCINE                                                Branch

 

           SARS-COV-2 COVID-19            2021 Completed             Unive

rsity of



           MODERNA 12+ YRS            00:00:00                         [place] [facility]

ical



           VACCINE                                                Branch

 

           SARS-COV-2 COVID-19            2021 Completed             Unive

rsity of



           MODERNA 12+ YRS            00:00:00                         [place] [facility]

ical



           VACCINE                                                Branch

 

           SARS-COV-2 COVID-19            2021 Completed             Unive

rsity of



           MODERNA 12+ YRS            00:00:00                         [place] [facility]

ical



           VACCINE                                                Branch

 

           SARS-CoV-2COVID-19m            2021 Completed             Urszula C[place]AND-[id number]<            00:00:00                         



           sup>2</sup>                                             

 

           SARS-COV-2 COVID-19            2021 Completed             Unive

rsity of



           MODERNA VACCINE            00:00:00                         [place] [facility]

ical



                                                                  Branch

 

           SARS-COV-2 COVID-19            2021 Completed             Unive

rsity of



           MODERNA VACCINE            00:00:00                         [place] [facility]

ical



                                                                  Branch

 

           SARS-COV-2 COVID-19            2021 Completed             Unive

rsity of



           MODERNA 12+ YRS            00:00:00                         [place] [facility]

ical



           VACCINE                                                Branch

 

           SARS-COV-2 COVID-19            2021 Completed             Unive

rsity of



           MODERNA 12+ YRS            00:00:00                         [place] [facility]

ical



           VACCINE                                                Branch

 

           SARS-COV-2 COVID-19            2021 Completed             Unive

rsity of



           MODERNA 12+ YRS            00:00:00                         [place] [facility]

ical



           VACCINE                                                Branch

 

           SARS-COV-2 COVID-19            2021 Completed             Unive

rsity of



           MODERNA 12+ YRS            00:00:00                         [place] [facility]

ical



           VACCINE                                                Branch

 

           SARS-COV-2 COVID-19            2021 Completed             Unive

rsity of



           MODERNA 12+ YRS            00:00:00                         [place] [facility]

ical



           VACCINE                                                Branch

 

           SARS-COV-2 COVID-19            2021 Completed             Unive

rsity of



           MODERNA 12+ YRS            00:00:00                         [place] [facility]

ical



           VACCINE                                                Branch

 

           SARS-COV-2 COVID-19            2021 Completed             Unive

rsity of



           MODERNA 12+ YRS            00:00:00                         [place] [facility]

ical



           VACCINE                                                Branch

 

           SARS-COV-2 COVID-19            2021 Completed             Unive

rsity of



           MODERNA 12+ YRS            00:00:00                         [place] [facility]

ical



           VACCINE                                                Branch

 

           Influenza High Dose            2020-10-02 Completed             Unive

rsity of



           Quad                  00:00:00                         [facility]

 

           Influenza High Dose            2020-10-02 Completed             Unive

rsity of



           Quad                  00:00:00                         [facility]

 

           Influenza High Dose            2020-10-02 Completed             Unive

rsity of



           Quad                  00:00:00                         [facility]

 

           Influenza High Dose            2020-10-02 Completed             Unive

rsity of



           Quad                  00:00:00                         [facility]

 

           Influenza High Dose            2020-10-02 Completed             Unive

rsity of



           Quad                  00:00:00                         [facility]

 

           Influenza High Dose            2020-10-02 Completed             Unive

rsity of



           Quad                  00:00:00                         [facility]

 

           Influenza High Dose            2020-10-02 Completed             Unive

rsity of



           Quad                  00:00:00                         [facility]

 

           Influenza High Dose            2020-10-02 Completed             Unive

rsity of



           Quad                  00:00:00                         [facility]

 

           Influenza High Dose            2020-10-02 Completed             Unive

rsity of



           Quad                  00:00:00                         [facility]

 

           Influenza High Dose            2020-10-02 Completed             Unive

rsity of



           Quad                  00:00:00                         [facility]

 

           Influenza Virus            2018 Completed             Universit

y of



           Vaccine               00:00:00                         [facility]

 

           Influenza Virus            2018 Completed             Universit

y of



           Vaccine               00:00:00                         [facility]



                                                                  Branch

 

           Influenza Virus            2018 Completed             Universit

y of



           Vaccine               00:00:00                         [facility]



                                                                  Branch

 

           Influenza Virus            2018 Completed             Universit

y of



           Vaccine               00:00:00                         [facility]



                                                                  Branch

 

           Influenza Virus            2018 Completed             Universit

y of



           Vaccine               00:00:00                         [facility]



                                                                  Branch

 

           Influenza Virus            2018 Completed             Universit

y of



           Vaccine               00:00:00                         [facility]



                                                                  Branch

 

           Influenza Virus            2018 Completed             Universit

y of



           Vaccine               00:00:00                         [facility]



                                                                  Branch

 

           Influenza Virus            2018 Completed             Universit

y of



           Vaccine               00:00:00                         [facility]

 

           Influenza Virus            2018 Completed             Universit

y of



           Vaccine               00:00:00                         [facility]

 

           Influenza Virus            2018 Completed             Universit

y of



           Vaccine               00:00:00                         [facility]







Vital Signs







             Vital Name   Observation Time Observation Value Comments     Source

 

             Systolic blood 2022 18:05:00 115 mm[Hg]                [facility]                                            [facility] [place]

 

             Diastolic blood 2022 18:05:00 65 mm[Hg]                 Unive

[facility]

 

             Heart rate   2022 18:02:00 70 /min                   [facility]

 

             Body height  2022 18:02:00 190.5 cm                  [facility]

 

             Body weight  2022 18:02:00 108.863 kg                [facility]

 

             BMI          2022 18:02:00 30.00 kg/m2               [facility]

 

             Oxygen saturation 2022 18:02:00 95 /min                   [facility]



             in Arterial blood                                        [facility]

anch



             by Pulse oximetry                                        

 

             Systolic blood 2022 18:05:00 115 mm[Hg]                [facility]

[facility] [place]



             pressure                                            [facility]

 

             Diastolic blood 2022 18:05:00 65 mm[Hg]                 [facility]

 

             [facility]   2022 18:02:00 70 /min                   [facility]

[facility]

 

             Body height  2022 18:02:00 190.5 cm                  [facility]

 

             Body weight  2022 18:02:00 108.863 kg                [facility]

 

             BMI          2022 18:02:00 30.00 kg/m2               [facility]

 

             Oxygen saturation 2022 18:02:00 95 /min                   [facility]



             in Arterial blood                                        Medical Br

anch



             by Pulse oximetry                                        

 

             Respitory Rate 2022 01:52:00                           [facility]

[facility] [place]

 

             Systolic (mm Hg) 2022 01:52:00                           [facility]less[facility]

rial [place]

 

             Diastolic (mm Hg) 2022 01:52:00                           [facility]

[facility] [place]

 

             Respitory Rate 2022 01:45:00                           [facility]

[facility] [place]

 

             Systolic (mm Hg) 2022 01:45:00                           [facility]

rial [place]

 

             Diastolic (mm Hg) 2022 01:45:00                           Mem

[facility] [place]

 

             Respitory Rate 2022 01:15:00                           [facility]

al [place]

 

             Systolic (mm Hg) 2022 01:15:00                           [facility]

rial [place]

 

             Diastolic (mm Hg) 2022 01:15:00                           Mem

[facility] [place]

 

             Heart Rate   2022 16:55:00                           [facility]

 [place]

 

             Height       2022 15:50:00 190.5 cm                  [facility]

 [place]

 

             Weight       2022 15:50:00                           [facility]

 [place]

 

             BMI Calculated 2022 15:50:00                           [facility]

al [place]

 

             Height       2022 13:20:00 190.5 cm                  [facility]

 [place]

 

             Weight       2022 13:20:00                           [facility]

 [place]

 

             BMI Calculated 2022 13:20:00                           [facility]

[facility] [place]

 

             Respitory Rate 2022 22:00:00                           [facility]

al [place]

 

             Systolic (mm Hg) 2022 22:00:00                           [facility]

rial [place]

 

             Diastolic (mm Hg) 2022 22:00:00                           Mem

[facility] [place]

 

             Respitory Rate 2022 21:30:00                           Mem[facility]

al [place]

 

             Systolic (mm Hg) 2022 21:30:00                           [facility]

rial [place]

 

             Diastolic (mm Hg) 2022 21:30:00                           Mem

[facility] [place]

 

             Respitory Rate 2022 21:15:00                           [facility]

[facility] [place]

 

             Systolic (mm Hg) 2022 21:15:00                           [facility]

rial [place]

 

             Diastolic (mm Hg) 2022 21:15:00                           Mem

[facility] [place]

 

             Height       2022 17:15:00 190.5 cm                  [facility]

 [place]

 

             Weight       2022 17:15:00                           [facility]

 [place]

 

             BMI Calculated 2022 17:15:00                           [facility]

[facility] [place]

 

             Height       2022 15:46:00 190.5 cm                  [facility]

 [place]

 

             Weight       2022 15:46:00                           [facility]

 [place]

 

             BMI Calculated 2022 15:46:00                           Mem[facility]

al [place]







Procedures







                Procedure       Date / Time     Performing Clinician Source



                                Performed                       

 

                AUTHORIZATION FOR 2022-10-07 05:01:00 Doctor Unassigned, [facility]



                [facility]                  Name            [facility]

 

                AUTHORIZATION FOR 2022 05:01:00 Doctor Unassigned, No [facility]

[facility]



                RELEASE OF PHI                  Name            [facility]

 

                EKG-12 LEAD     2022 18:09:03 Valente Platt [facility]

 

                Procedure<sup>3</sup>                                 [facility]

[facility]

 

                Assess fracture                                 [facility]



                care<sup>1</sup>                                 







Encounters







        Start   End     Encounter Admission Attending Care    Care    Encounter 

Source



        Date/Time Date/Time Type    Type    Clinicians Facility Department ID   

   

 

        2023         Outpatient                 [facility]     [facility]     [id number]

 [facility]



        12:59:09                                                 [id number] [facility]

 

        2023         Outpatient                 UTH     UTH     [id number]

 [facility]



        15:58:03                                                 [id number] [facility]

 

        2023         Inpatient LORETTA StanleyCL   OUTD    [id number] 

[facility]



        09:30:00                         Hal                  24      [facility]

 

        2022         Outpatient                 [facility]     [id number] 

[place]



        10:01:10                                                         [facility]

 

        2021-10-31         Emergency                 [facility]    [id number] 

Univers



        19:20:35                                                         ity [facility]

 

        2020         Inpatient LORETTA HuntTO   DAYS    [id number]

[id number]



        08:45:00                         [place] [id number]      [place]



                                                                        [facility]



                                                                        dic



                                                                        Hospita



                                                                        l

 

        2023 Refill          Giulia, [facility]    [id number]

[id number] Univers



        00:00:00 00:00:00                 Valente OHARA [id number]      

   ity of



                                                [facility] [id number]         [place]

[place]



                                                PROFESSIO [phone]         Me

dical



                                                NAL     [address]             [facility]                 

 

        2023 Office          Gakrysten [facility] [id number]

[id number] [place]



        10:45:00 11:05:27 Visit           [facility] [id number]         H

[facility] [id number]         



                                                PLAZA 0 [id number]         



                                                                       

 

        2023 Outpatient                 UTH     UTH     [id number]

[id number] [facility]



        10:45:00 11:02:50                                                 [facility]

 

        2023 Outpatient       Swapna, [facility]    [id number]

225 [facility]



        05:25:00 05:25:00                 Hal                  24      [facility]

 

        2022 Refill          Giulia, [facility]    1.2.840.114 [id number]

90 Univers



        00:00:00 00:00:00                 Valente OHARA 350.1.13.10      

   ity of



                                                [facility] 4.2.7.2.686         [place]

[place]



                                                PROFESS[place] [phone]         Me

dical



                                                NAL     059             [facility]                 

 

        2022 Refill          Giulia, [facility]    1.2.840.114 [id number]

99 Univers



        00:00:00 00:00:00                 Valente OHARA 350.1.13.10      

   ity of



                                                [facility] 4.2.7.2.686         [place]

[place]



                                                PROFESSIO [phone]         Me

dical



                                                NAL     059             [facility]                 

 

        2022 Outpatient                 [facility]     [id number]

93 [place]



        00:00:00 12:50:11                                                 [facility]

 

        2022 Outpatient         [facility]     [id number]

086 [place]



        10:45:00 12:49:58                 [facility]

 

        2022 Outpatient         [facility]     [id number]

918 [place]



        14:15:00 14:15:00                 [facility]

 

        2022-10-07 2022-10-07 Orders          Doctor  CHENCHO    1.2.840.114 [id number]

25 Univers



        00:00:00 00:00:00 Only            Unassigned, IRINA   350.1.13.10       

  ity of



                                        [facility] 4.2.7.2.686         Gigi

as



                                                        [phone]         [address]

 

        2022 Patient         Platt, [facility]    1.2.840.114 [id number]

93 Univers



        00:00:00 00:00:00 Secure Msg         Valente OHARA 350.1.13.10   

      ity of



                                                [place] 4.2.7.2.686         [place]

[place]



                                                PROFESSIO [phone]         Me

dic[address]                 

 

        2022 Patient         Platt, [facility]    1.2.840.114 [id number]

61 [facility]



        00:00:00 00:00:00 Secure Msg         Valente OHARA 350.1.13.10   

      ity of



                                                [place] 4.2.7.2.686         [place]

[place]



                                                PROFESSIO [phone]         [address]                 

 

        2022 Orders          Doctor  CHENCHO    1.2.840.114 [id number]

18 Univers



        00:00:00 00:00:00 Only            Unassigned, IRINA   350.1.13.10       

  ity of



                                        [facility] 4.2.7.2.686         Gigi

as



                                                        [phone]         [address]

 

        2022 Office          JOSH [facility] 1.2.840.114 [id number]

9942 [place]



        14:15:00 16:04:36 Visit           SIMEON [place] 350.1.13.58         H

[facility] 9.2.7.2.686         



                                                PLAZA 3 [phone]         



                                                        5               

 

        2022-08-10 2022-08-10 Office          JOSH [facility] 1.2.840.114 [id number]

8268 [place]



        14:15:00 16:51:38 Visit           [facility] 350.1.13.58         H

[facility]



                                                MEDICAL 9.2.7.2.686         



                                                PLAZA 3 [phone]         



                                                        5               

 

        2022 Day                     [facility] [id number]

775 Memoria



        15:41:00 01:52:00 Surgery                 r       [place] 01      l



                                                        [facility]         

 

        2022 Outpatient         GAUVAIN, MHBL    [facility]BL    7501  

  MHBL



        10:41:00 20:52:00                 [facility]                         

 

        2022 Outpatient         Gauvain, MHPL    MHPL    [id number]

8775 



        10:41:00 20:52:00                 [place] [address]      



                                        [place]                          

 

        2022 Outpatient         GAUVAIN, [facility]     [id number]

419 [place]



        16:30:00 16:30:00                 [facility]

 

        2022 Office          Gauvain, [facility] 1.2.840.114 [id number]

1026 [place]



        14:30:00 15:18:30 Visit           [facility]imeon [facility] 350.1.13.58         H

[facility]



                                                MEDICAL 9.2.7.2.686         



                                                PLAZA 3 [phone]         



                                                        5               

 

        2022 Office          Gauvharish, [facility] 1.2.840.114 [id number]

5535 [place]



        10:45:00 11:00:00 Visit           [facility]imeon [facility] 350.1.13.58         H

[facility]



                                                MEDICAL 9.2.7.2.686         



                                                PLAZA 0 [phone]         



                                                        5               

 

        2022 Office          Gauvharish, [facility] 1.2.840.114 [id number]

7388 [place]



        10:30:00 10:45:00 Visit           [facility]imeon [facility] 350.1.13.58         H

[facility]



                                                MEDICAL 9.2.7.2.686         



                                                PLAZA 5 [phone]         



                                                        5               

 

        2022 Outpatient CIRILO BOYD [facility]    [id number]

062 [facility]



        15:15:00 15:15:00                 RAMÍREZ echevarria [facility]

 

        2022 Office          Josh [facility] 1.2.840.114 [id number]

2080 [place]



        10:15:00 10:30:00 Visit           [facility] 350.1.13.58         H

[facility] 9.2.7.2.686         



                                                PLAZA 7 [phone]         



                                                        5               

 

        2022 Day                     [facility] [id number]

775 Memoria



        15:51:00 04:59:00 Surgery                 r       [place] 00      [facility]

 

        2022 Outpatient         JOSH, [facility]    7500  

  [facility]



        10:51:00 23:59:00                 [facility]                         

 

        2022 Outpatient         Josh, [facility]   [id number]

8775 



        10:51:00 23:59:00                 [address]                          

 

        2022 Outpatient         JOSH [facility]     [id number]

543 [place]



        13:30:00 13:30:00                 [facility]

 

        2022 [facility]    [id number]

188 Univers



        12:48:19 23:59:00                 SENDIL                          wale [facility]

 

        2022 Rosa M Platt [facility]    1.2.342.642 [id number]

6148 Univers



        00:00:00 00:00:00                 Sendil JOSE OHARA 350.1.13.10      

   ity of



                                                [place] 4.2.7.2.686         [place]

[place]



                                                PROFESSIO [phone]         Me

[place] 678439             [facility]                 

 

        2022 Orders          Doctor  CHENCHO    1.2.840.114 [id number]

88 Univers



        00:00:00 00:00:00 Only            Unassigned, IRINA   350.1.13.10       

  ity of



                                        [facility] 4.2.7.2.686         Gigi

as



                                                        [phone]         [address]

 

        2022 Outpatient CIRILO PLATT [facility]    [id number]

829 Univers



        09:12:52 23:59:00                 SENDIL                          ity [facility]

 

        2022 Hospital         [facility]    1.2.840.114 [id number]

418 Univers



        09:12:52 23:59:00 Encounter         Valente OHARA 350.1.13.10    

     ity [facility] 4.2.7.2.686         [place]

[place]



                                                [place]  [phone]         Medi

melissa



                                                        801             Branch

 

        2022 Outpatient R       GIULIA [facility]    [id number]

829 Univers



        09:12:52 09:12:52                 SENDIL                          ity [facility]

 

        2022 Telephone         [facility]    1.2.897.491 [id number]

8598 Univers



        00:00:00 00:00:00                 Senddaly OHARA 350.1.13.10      

   ity [facility] 4.2.7.2.686         [place]

[place]



                                                PROFESSIO [phone]         Me

[place] 678439             [facility]                 

 

        2022-04-15 2022-04-15 Office          MIRTHA Moreno [facility] 1.2.840.114 [id number]

4521 [place]



        10:15:00 10:30:00 Visit           [facility]imeon [facility] 350.1.13.58         H

[facility] 9.2.7.2.686         



                                                PLAZA 2 [phone]         



                                                        5               

 

        2022-04-15 2022-04-15 Orders          Doctor  CHENCHO    1.2.840.114 [id number]

33 Univers



        00:00:00 00:00:00 Only            Unassigned, IRINA   350.1.13.10       

  ity of



                                        [facility] 4.2.7.2.686         Gigi

as



                                                        [phone]         Medi

melissa



                                                        009             Branch

 

        2022 Outpatient R       GIULIA [facility]    [id number]

666 Univers



        00:00:00 00:00:00                 SENDIL                          ity [facility]

 

        2022 Outpatient R       [facility]    [id number]

160 Univers



        13:00:00 13:33:49                 SENDIL                          ity [facility]

 

        2022 Office          [facility]    1.2.840.114 [id number]

57 Univers



        13:00:00 13:33:49 Visit           Valente OHARA 350.1.13.10      

   ity of



                                                [place] 4.2.7.2.686         [place]

[place]



                                                PROFESSIO [phone]         Me

dical



                                                NAL     059             [facility]                 

 

        2022 Office          [facility]    1.2.840.114 [id number]

57 Univers



        13:00:00 13:33:49 Visit           Valente OHARA 350.1.13.10      

   ity of



                                                [place] 4.2.7.2.686         [place]

[place]



                                                PROFESSIO [phone]         Me

dical



                                                NAL     9             [facility]                 

 

        2022 Orders          Doctor  CHENCHO    1.2.840.114 [id number]

55 [facility]



        00:00:00 00:00:00 Only            Unassigned, IRINA   350.1.13.10       

  ity of



                                        [facility] 4.2.7.2.686         Gigi

as



                                                        [phone]         [address]

 

        2022 Outpt Diag                 nullFlavo [facility]    [id number]

09467 [facility]



        15:24:00 04:59:00 Services                 r       Outpatient 00      l



                                                        Imaging         Donaldo Arce         

 

        2022 Outpatient         KIAA Moreno   [facility]   [id number]

8785 



        10:24:00 23:59:00                 Simeon Barrow                          

 

        2022 Telephone         Anastasia Soliman [facility] 1.2.840

.114 [id number] 

[place]



        00:00:00 00:00:00                 Anastasia Soliman 350.1.13.58 

        Health



                                                MEDICAL 9.2.7.2.686         



                                                PLAZA 3 [phone]         



                                                        5               

 

        2022 Office          MIRTHA MORENO [facility] 1.2.840.114 [id number]

0395 [place]



        09:45:00 10:00:00 Visit           SIMEON ARCE 350.1.13.58         H

[facility]



                                                MEDICAL 9.2.7.2.686         



                                                PLAZA 2 [phone]         



                                                        5               

 

        2021-10-06 2021-10-06 Office          Giulia [facility]    1.2.840.114 [id number]

66 Univers



        12:49:57 13:23:05 Visit           Valente Ohara 350.1.13.10      

   ity of



                                                [place] 4.2.7.2.686         [place]

[place]



                                                [place]fess[place] [phone]         [place] [address]25669             Branch



                                                [facility]                 

 

        2021-10-06 2021-10-06 Outpatient R       GIULIA [facility]    [id number]

946 Univers



        13:00:00 13:00:00                 SENDIL                          ity of



                                                                        [facility]

 

        2021-10-06 2021-10-06 Orders          Doctor  CHENCHO    1.2.840.114 [id number]

17 Univers



        00:00:00 00:00:00 Only            Unassigned, IRINA   350.1.13.10       

  ity of



                                        [facility] 4.2.7.2.686         Gigi

as



                                                        [phone]         Medi

melissa



                                                        009             Branch

 

        2021 Refill          Giulia [facility]    1.2.840.114 [id number]

40 Univers



        00:00:00 00:00:00                 Valente [facility]Marcelino [facility]  350.1.13.10       

  ity of



                                                [place]   4.2.7.2.686         [place]

[place]



                                                City    [phone]         Medi

melissa



                                                Primary & 9             Branch



                                                Specialty                 



                                                Care                    

 

        2021 Refill          Giulia [facility]    1.2.840.114 [id number]

15 Univers



        00:00:00 00:00:00                 Valente [facility]  350.1.13.10       

  ity of



                                                [place]   4.2.7.2.686         [place]

[place]



                                                [place]    [phone]         [place] [address]             Branch



                                                Office                  



                                                Building                 

 

        2021 Refill          Giulia [facility]    1.2.840.114 [id number]

00 Univers



        00:00:00 00:00:00                 Valente Ohara 350.1.13.10      

   ity of



                                                [place] 4.2.7.2.686         [place]

[place]



                                                Professio [phone]         [place] [address]             Branch



                                                Building                 

 

        2021 Refill          Giulia [facility]    1.2.840.114 [id number]

32 Univers



        00:00:00 00:00:00                 Valente RELLMarcelino [place] 350.1.13.10      

   ity of



                                                [place] 4.2.7.2.686         [place]

[place]



                                                Professio [phone]         Me

dical



                                                nal     059             Branch



                                                Building                 

 

        2021 Emergency         Ebrahim, [facility]    1.2.840.114 [id number]

[id number] Univers



        11:41:00 17:36:00                 Janice Ramirezton 350.1.13.10         i

ty of



                                                [place] 4.2.7.2.686         [place]

[place]



                                                [place]  [phone]         Medi

melissa



                                                        084             Branch

 

        2021 Emergency X       EBRAHIM, [facility]    ERT     [id number]

528 Univers



        11:41:00 17:36:00                 PHYLICIAIA                           ity [facility]

 

        2021 Nurse           Nurse, [facility]    1.2

.840.114 [id number] 

Univers



        11:40:01 11:55:01 Visit           [facility]  350.1.13.10      

   ity [facility] 4.2.7.2.686         Gigi

as



                                                Professio [phone]         [facility]     044             [place]



                                                Office                  



                                                Building                 



                                                One                     

 

        2021 Outpatient R               [facility]    [id number]

417 Univers



        11:20:00 11:20:00                                                 ity of



                                                                        [facility]

 

        2021 Telephone         [facility]    1.2.840.114 [id number]

97578 Univers



        00:00:00 00:00:00                 Min DE [place]PEC 350.1.13.10         

ity of



                                                IALTY   4.2.7.2.686         [facility]  [phone]         Medi

melissa



                                                AND [address]



                                                DIABETES                 



                                                CLINIC                  

 

        2021 Outpatient R       PHOENIX [facility]    [id number]

573 Univers



        09:30:00 09:30:00                 VASILE                            ity of



                                                                        [facility]

 

        2021 Office          [facility]    1.2.840.114 [id number]

40 Univers



        09:55:05 10:26:32 Visit           Rosa DE [place]OS[place] 350.1.13.10      

   ity of



                                                IALTY   4.2.7.2.686         [place]

[place]



                                                CENTER  [phone]         Medi

melissa



                                                AND MATHUR 028             [place]



                                                DIABETES                 



                                                CLINIC                  

 

        2021 Outpatient R       ERICK [facility]    [id number]

772 Univers



        10:15:00 10:15:00                 ROSA                          ity [facility]

 

        2021 Orders          Doctor  CHENCHO    1.2.840.114 [id number]

76 Univers



        00:00:00 00:00:00 Only            Unassigned, IRINA   350.1.13.10       

  ity of



                                        [facility] 4.2.7.2.686         Gigi

as



                                                        [phone]         Medi

melissa



                                                        009             Branch

 

        2021 Telephone         [place] [facility]    1.2.927.992 [id number]

2749 Univers



        00:00:00 00:00:00                 Vasile    MULTISPEC 350.1.13.10         

ity of



                                                IALTY   4.2.7.2.686         [place]

[place]



                                                CENTER  [phone]         Medi

melissa



                                                AND MATHUR 085             [place]



                                                DIABETES                 



                                                CLINIC                  

 

        2021 Office          Giulia [facility]    1.2.840.114 [id number]

30 Univers



        12:53:31 13:51:45 Visit           Valente Ohara 350.1.13.10      

   ity of



                                                [place] 4.2.7.2.686         [place]

[place]



                                                [place] [phone]         Me

dical



                                                nal     059             [facility]                 

 

        2021 Outpatient R       GIULIA [facility]    [id number]

903 Univers



        13:00:00 13:00:00                 SENDIL                          ity [facility]

 

        2021 Patient         Doctor  [facility]    1.2.840.114 [id number]

38 Univers



        00:00:00 00:00:00 Secure Msg         Unassigned, Health  350.1.13.10    

     ity of



                                        [place] 4.2.7.2.686         Gigi

as



                                                Professio [phone]         Me

dical



                                                nal     044             [place]



                                                Office                  



                                                Building                 



                                                One                     

 

        2021 Patient         Giulia [facility]    1.2.840.114 [id number]

75 Univers



        00:00:00 00:00:00 Secure Msg         [facility]il [facility]  350.1.13.10    

     ity of



                                                [place]   4.2.7.2.686         [place]

[place]



                                                [place]    [phone]         [address]



                                                Office                  



                                                [facility]                 

 

        2021 Refill          Giulia [facility]    1.2.840.114 [id number]

79 Univers



        00:00:00 00:00:00                 Sendil [facility] 350.1.13.10      

   ity of



                                                [place] 4.2.7.2.686         [place]

[place]



                                                [place] [phone]         [address]                 

 

        2021 Outpatient R       GIULIA [facility]    [id number]

847 Univers



        10:30:00 10:30:00                 SENDIL                          ity [facility]

 

        2021 Patient         Giulia [facility]    1.2.840.114 [id number]

30 Univers



        00:00:00 00:00:00 Secure Msg         Sendil [facility] 350.1.13.10   

      ity of



                                                [place] 4.2.7.2.686         [place]

[place]



                                                Professio [phone]         [address]                 

 

        2021 Outpatient CIRLIO BERGERON [facility]    [id number]

43399 Univers



        12:10:00 12:10:00                 ALEXEI                             ity [facility]

 

        2021 Patient         Giulia [facility]    1.2.840.114 [id number]

65 Univers



        00:00:00 00:00:00 Secure Msg         Sendil [facility]  350.1.13.10    

     ity of



                                                [place]   4.2.7.2.686         [place]

[place]



                                                [place]    [phone]         [address]



                                                Office                  



                                                [facility]                 

 

        2021 Outpatient CIRILO BERGERON [facility]    [id number]

14847 Univers



        12:10:00 12:10:00                 ALEXEI                             ity [facility]

 

        2020 Telephone         Giulia [facility]    1.2.471.838 [id number]

0250 Univers



        00:00:00 00:00:00                 Sendil [facility] [facility] 350.1.13.10      

   ity of



                                                [place] 4.2.7.2.686         [place]

[place]



                                                Professio [phone]         Me

dical



                                                nal     059             [facility]                 

 

        2020 Hospital         [facility]    1.2.840.114 [id number]

474 Univers



        07:38:19 23:59:00 Encounter         Valente Ohara 350.1.13.10    

     ity of



                                                [place] 4.2.7.2.686         [place]

[place]



                                                [place]  [phone]         Medi

melissa



                                                        [address]             [place]

 

        2020 Technician         Arnaldo, Adc [facility]    1.2.8

40.114 [id number] 

Univers



        07:42:26 07:57:26 Visit           Valente Platt 350.1.13.

10         ity of



                                                [place] 4.2.7.2.686         [place]

[place]



                                                Profess[place] [phone]         Me

dical



                                                nal     353             [facility]                 

 

        2020 Outpatient R       [facility]    [id number]

986 Univers



        00:00:00 00:00:00                 SENDIL                          ity of



                                                                        [facility]

 

        2020 Patient         [facility]    1.2.840.114 [id number]

35 Univers



        00:00:00 00:00:00 Secure Msg         Valente Ohara 350.1.13.10   

      ity of



                                                [place] 4.2.7.2.686         [place]

[place]



                                                [facility] [phone]         Me

dical



                                                nal     059             [facility]                 

 

        2020 Telephone         [facility]    1.2.446.434 [id number]

2394 Univers



        00:00:00 00:00:00                 Vasile    MULTISPEC 350.1.13.10         

ity of



                                                IAL[place]   4.2.7.2.686         [place]

[place]



                                                [place]  [phone]         Medi

melissa



                                                AND KAREEN 085             [place]



                                                DIABETES                 



                                                CLINIC                  

 

        2020 Orders          Doctor  CHENCHO    1.2.840.114 [id number]

66 Univers



        00:00:00 00:00:00 Only            Unassigned, IRINA   350.1.13.10       

  ity of



                                        [place] [facility] 4.2.7.2.686         Gigi

as



                                                        [phone]         Medi

melissa



                                                        009             [place]

 

        2020 Telephone         Boyd [facility]    1.2.584.527 [id number]

1400 Univers



        00:00:00 00:00:00                 [facility]  350.1.13.10         it

y of



                                                [place] 4.2.7.2.686         [place]

as



                                                Professio [phone]         Me

dical



                                                [facility]     044             [place]



                                                Office                  



                                                Building                 



                                                One                     

 

        2020-10-19 2020-10-26 Office          [facility]    1.2.840.114 [id number]

61 Univers



        10:49:09 15:30:50 Visit           Rosa May MULTISPEC 350.1.13.10      

   ity of



                                                [facility]   42.7.2.686         [facility]  [phone]         Medi

melissa



                                                AND MATHUR Geovanny             [place]



                                                DIABETES                 



                                                CLINIC                  

 

        2020-10-19 2020-10-26 Office          [facility]    1.2.840.114 [id number]

61 



        10:49:09 15:30:50 Visit           Rosa May MULTISPEC 350.1.13.10      

   



                                                [facility]   42.7.2.686         



                                                [place]  [phone]         



                                                AND MATHUR [facility]             



                                                DIABETES                 



                                                CLINIC                  

 

        2020-10-19 2020-10-19 Outpatient CIRILO SUAREZ [facility]    [id number]

650 Univers



        11:00:00 11:00:00                 ROSA                          ity of



                                                                        [facility]

 

        2020 [facility]vin [facility]    1.2.840.114 [id number]

49 Univers



        00:00:00 00:00:00                 [facility]  350.1.13.10       

  ity of



                                                [place]   4.2.7.2.686         [facility]    [phone]         Medi

melissa



                                                Primary & 059             [place]anch



                                                Specialty                 



                                                Care                    

 

        2020 Telephone         CHENCHO [place]    [id number] [id number]

4046 Univers



        00:00:00 00:00:00                 Ramírez AREVALO   350.1.13.10         it

y of



                                                [facility] 4.2.7.2.686         Gigi

as



                                                        [phone]         Medi

melissa



                                                        019             [place]

 

        2020 Outpatient CIRILO CONLEY [facility]    [id number]

823 Univers



        11:30:00 11:30:00                 VASILE                            ity [facility]

 

        2020 Laboratory         Lab, Adc Fam Pob I [facility]    1.2.

840.114 [id number] 

Univers



        10:14:42 10:34:42 Only            [facility]u [facility]  350.1.13.10    

     ity of



                                                [place] 4.2.7.2.686         [place]igi

as



                                                Professio [phone]         [address]



                                                Office                  



                                                [facility]                 



                                                One                     

 

        2020 Outpatient R               [facility]    [id number]

807 Univers



        10:20:00 10:20:00                                                 ity of



                                                                        [facility]

 

        2020 Office          Erick [facility]    1.2.840.114 [id number]

99 Univers



        13:11:00 00:08:24 Visit           [facility]osa [facility] 350.1.13.10      

   ity of



                                                [place]   4.2.7.2.686         [place]



                                                CENTER  [phone]         [address]



                                                DIABETES                 



                                                CLINIC                  

 

        [facility]    [id number]

910 Univers



        13:40:00 13:40:00                 ROSA                          [facility]

 

        2020 Office          [facility]    1.2.840.114 [id number]

52 Univers



        13:57:53 16:33:51 Visit           Rosa [facility] 350.1.13.10      

   ity of



                                                [place]   4.2.7.2.686         [place]

[place]



                                                CENTER  [phone]         [address]



                                                DIABETES                 



                                                CLINIC                  

 

        2020 Refill          Doctor  [facility]    1.2.840.114 [id number]

91 Univers



        00:00:00 00:00:00                 Unassigned, Health  350.1.13.10       

  ity of



                                        [place] 4.2.7.2.686         Gigi

as



                                                Professio [phone]         [address]



                                                Office                  



                                                Building                 



                                                One                     

 

        2020 [facility]patient R       [facility]    [id number]

765 Univers



        14:15:00 14:15:00                 ROSA                          [facility]

 

        2020 Refill          Giulia [facility]    1.2.840.114 [id number]

62 Univers



        00:00:00 00:00:00                 Sendil [facility] 350.1.13.10      

   ity of



                                                [place] 4.2.7.2.686         [place]

[place]



                                                [place] [phone]         Me

dical



                                                nal     059             [facility]                 

 

        2020 Outpatient         ASTYA Ames   RADI    Y000

[id number] [facility]



        09:30:00 09:30:00                 Horace                   75      [place]



                                                                        [facility]



                                                                        dic



                                                                        Hospita



                                                                        l

 

        2020 Patient         Doctor  [facility]    1.2.840.114 [id number]

12 Univers



        00:00:00 00:00:00 Secure Msg         Unassigned, [facility]  350.1.13.10    

     ity of



                                        [place] 4.2.7.2.686         [place]igi

as



                                                Professio [phone]         Me

dical



                                                nal     044             [facility]                 



                                                One                     

 

        2020 Patient         Doctor  [facility]    1.2.840.114 [id number]

43 Univers



        00:00:00 00:00:00 Secure Msg         Unassigned, [place] 350.1.13.10   

      ity of



                                        [place] 4.2.7.2.686         [place]

[place]



                                                Profess[place] [phone]         Me

dical



                                                nal     059             [facility]                 

 

        2020 Patient         Giulia, [facility]    1.2.840.114 [id number]

02 Univers



        00:00:00 00:00:00 Secure Msg         Sendil JOSE Ohara 350.1.13.10   

      ity of



                                                [place] 4.2.7.2.686         [place]

[place]



                                                [place] [phone]         Me

dical



                                                nal     059             [facility]                 

 

        2020 Refill          Giulia [facility]    1.2.840.114 [id number]

07 Univers



        00:00:00 00:00:00                 Sendil ARMENHMarcelino Ohara 350.1.13.10      

   ity of



                                                [place] 4.2.7.2.686         [place]

[place]



                                                [place] [phone]         Me

dical



                                                nal     9             [facility]                 

 

        2020 Refill          Giulia [facility]    1.2.840.114 [id number]

68 Univers



        00:00:00 00:00:00                 Sendil i2O [employer].[facility]. [facility]  350.1.13.10       

  ity of



                                                [place]   4.2.7.2.686         [place]

[place]



                                                City    [phone]         Medi

melissa



                                                Primary & 059             [place]



                                                Specialty                 



                                                Care                    

 

        2020 Office          Alfredo  [facility]    1.2.840.114 [id number]

81 Univers



        16:33:02 13:29:06 Visit           Anu [facility]  350.1.13.10         it

y of



                                                [place] 4.2.7.2.686         [place]igi

as



                                                Professio [phone]         Me

dical



                                                nal     044             [place]



                                                Office                  



                                                Building                 



                                                One                     

 

        2020 Refill          Blanche   [facility]    1.2.840.114 [id number]

74 Univers



        00:00:00 00:00:00                 Shiwan  [place] 350.1.13.10         i

ty of



                                                [place] 4.2.7.2.686         [place]

[place]



                                                [place] [phone]         Me

dical



                                                nal     085             [facility]                 

 

        2020 Refill          Doctor  [facility]    1.2.840.114 [id number]

75 Univers



        00:00:00 00:00:00                 Unassigned, [place] 350.1.13.10      

   ity of



                                        [place] 4.2.7.2.686         [place]

[place]



                                                Profess[place] [phone]         Me

dical



                                                nal     085             [facility]                 

 

        2020 Orders          Doctor  [facility]CHO    1.2.840.114 [id number]

67 Univers



        00:00:00 00:00:00 Only            Unassigned, IRINA   350.1.13.10       

  ity of



                                        [facility] 4.2.7.2.686         [place]igi

as



                                                        [phone]         Medi

melissa



                                                        009             [place]

 

        2020 Patient         Doctor  [facility]    1.2.840.114 [id number]

28 Univers



        00:00:00 00:00:00 Secure Msg         Unassigned, MULTISPEC 350.1.13.10  

       ity of



                                        No Name [place]AL[place]   4.2.7.2.686         [place]

[place]



                                                [place]  [phone]         Medi

melissa



                                                AND KAREEN 085             Branch



                                                DIABETES                 



                                                CLINIC                  

 

        2020 [facility]         [facility]    1.2.840.114 [id number]

394 Univers



        09:59:00 23:59:00 Encounter         Ramírez [place] 350.1.13.10        

 ity of



                                                [place] 4.2.7.2.686         [place]

[place]



                                                [place]  [phone]         Medi

melissa



                                                        806             [place]

 

        2020 Hospital         [facility]    1.2.840.114 [id number]

793 Univers



        09:12:00 09:58:00 Encounter         Ramírez Hel[place] 350.1.13.10        

 ity of



                                                [place] 4.2.7.2.686         [place]

[place]



                                                [place]  [phone]         Medi

melissa



                                                        800             [place]

 

        2020 Orders          Doctor  [facility]CHO    1.2.840.114 [id number]

93 Univers



        00:00:00 00:00:00 Only            Unassigned, IRINA   350.1.13.10       

  ity of



                                        [place] [facility] 4.2.7.2.686         Gigi

as



                                                        [phone]         Medi

melissa



                                                        009             [place]

 

        2020 Patient         Doctor  KEVIN 1.2.369.001 [id number]

6827 Univers



        00:00:00 00:00:00 Secure Msg         Unassigned, [facility] 350.1.13.10   

      ity of



                                        No Name [facility] 4.2.7.2.686         [place]

[place]



                                                        [phone]         Medi

melissa



                                                        084             [place]

 

        2020 Patient         Giulia [facility]    1.2.840.114 [id number]

95 Univers



        00:00:00 00:00:00 Secure Msg         Valente Ohara 350.1.13.10   

      ity of



                                                [place] 4.2.7.2.686         [place]

[place]



                                                Profess[place] [phone]         [address]                 

 

        2019 Refill          [facility]    1.2.840.114 [id number]

13 Univers



        00:00:00 00:00:00                 Valente Ohara 350.1.13.10      

   ity of



                                                [place] 4.2.7.2.686         [place]

[place]



                                                Professio [phone]         [address]                 

 

        2019 Refill          Giulia [facility]    1.2.840.114 [id number]

25 Univers



        00:00:00 00:00:00                 Sendil K.HMracelino [facility] 350.1.13.10      

   ity of



                                                [place] 4.2.7.2.686         [place]

[place]



                                                [facility] [phone]         Me

dical



                                                [facility]     059             [place]



                                                Building                 

 

        2019 Refill          Giulia [facility]    1.2.840.114 [id number]

15 Univers



        00:00:00 00:00:00                 Sendil K.H. [facility]  350.1.13.10       

  ity of



                                                [place]   4.2.7.2.686         [place]

[place]



                                                City    [phone]         Medi

melissa



                                                Primary & 059             [place]



                                                Specialty                 



                                                Care                    

 

        2019 Orders          Doctor  CHENCHO    1.2.840.114 [id number]

19 Univers



        00:00:00 00:00:00 Only            Unassigned, IRINA   350.1.13.10       

  ity of



                                        [place] [facility] 4.2.7.2.686         Gigi

as



                                                        [phone]         Medi

melissa



                                                        009             [place]

 

        2018 Outpatient                 [facility]

[address]352 Common



        11:00:00 11:00:00                         t Bone  Bone and         Spiri

t



                                                [facility]           - [facility]



                                                [facility]         [facility]







Results







           Test Description Test Time  Test Comments Results    Result Comments 

Source









                    ACT-ISTAT           2023 11:50:00 









                      Test Item  Value      Reference Range Interpretation Comme

nts









             ACT-ISTAT (test code = ACTI) 287 SEC             H           

 Performed by certified  at



                                                                 [facility] 

Ctr



GLUCOSE [id number] 09:29:00





             Test Item    Value        Reference Range Interpretation Comments

 

             GLUCOSE BEDSIDE (test 139 MG/DL           H            Perfor

med by certified



             code = GLUBED)                                         at [facility]



BASIC METABOLIC [id number] 11:19:00





             Test Item    Value        Reference Range Interpretation Comments

 

             SODIUM (test code = 144 mEq/L    134-147      N            



             NA)                                                 

 

             POTASSIUM (test code 4.0 mEq/L    3.4-5.0      N            



             = K)                                                

 

             CHLORIDE (test code 110 mEq/L    100-108      H            



             = CL)                                               

 

             CARBON DIOXIDE (test 25 mEq/l     21-33        N            



             code = CO2)                                         

 

             ANION GAP (test code 13           0-20         N            



             = GAP)                                              

 

             GLUCOSE (test code = 158 mg/dL           H            



             GLU)                                                

 

             BLOOD UREA NITROGEN 14 mg/dL     7-18         N            



             (test code = BUN)                                        

 

             GLOMERULAR   94.2         80-90        H            The Glomerular



             FILTRATION RATE                                        Filtration R

ate is a



             (test code = GFR)                                        calculated



                                                                 parameterbased 

on serum



                                                                 Creatinine, pat

ient age



                                                                 and sex. GFR va

luesless



                                                                 than 60 mL/min/

1.73



                                                                 square meters a

re



                                                                 indicative ofCh

ronic



                                                                 Kidney Disease.

 Values



                                                                 less than 15



                                                                 mL/min/1.73squa

re meters



                                                                 indicate Kidney

 failure.



                                                                 The calculation

 forGFR



                                                                 is based on the

 CKD-EPI



                                                                 () calculat

ion. This



                                                                 formulais race



                                                                 indifferent and

 is the



                                                                 recommended for

piotr for



                                                                 GFRby the Natio

nal



                                                                 Kidney Foundati

on for



                                                                 Adults.The GFR 

will not



                                                                 calculate if th

e sex is



                                                                 unknown or if



                                                                 thepatient's ag

e is <18



                                                                 years.

 

             CREATININE (test 0.9 mg/dL    0.6-1.3      N            



             code = CREAT)                                        

 

             CALCIUM (test code = 9.8 mg/dL    8.0-10.5     N            



             CA)                                                 



PROTHROMBIN [id number] 11:03:00





             Test Item    Value        Reference Range Interpretation Comments

 

             PROTHROMBIN TIME 10.8 SECONDS 9.3-12.9     N            



             PATIENT (test code =                                        



             PTP)                                                

 

             INTERNATIONAL NORMAL 1.0          0.8-1.2      N             TARGET

 INR BY



             RATIO (test code =                                        INDICATIO

N Indication



             INR)                                                INR1. Prophylax

is of



                                                                 venous thrombos

is 2.0



                                                                 - 3.0 (orthoped

ic



                                                                 surgery), Proph

ylaxis



                                                                 of venous throm

bosis



                                                                 (other than hig

h-risk



                                                                 surgery), Treat

ment of



                                                                 Deep Vein



                                                                 Thrombosis/Pulm

onary



                                                                 Embolism, Preve

ntion



                                                                 of systemic emb

olism -



                                                                 Tissue heart va

lves,



                                                                 Acute Myocardia

l



                                                                 Infarction (to 

prevent



                                                                 systemic emboli

sm),



                                                                 Valvular heart



                                                                 disease, Atrial



                                                                 Fibrillation,



                                                                 Bileaflet mecha

nical



                                                                 valve in aortic



                                                                 position.2. Mec

hanical



                                                                 prosthetic valv

es



                                                                 (high risk), 2.

5 - 3.5



                                                                 Presence of Lup

us



                                                                 Anticoagulant o

r



                                                                 Antiphospholipi

d



                                                                 Antibodies, Pre

vention



                                                                 of systemic emb

olism -



                                                                 Acute Myocardia

l



                                                                 Infarction (to 

prevent



                                                                 recurrent infar

ct).



CBC W/AUTO [id number] 11:03:00





             Test Item    Value        Reference Range Interpretation Comments

 

             WHITE BLOOD CELL (test code = 9.0 x10 3/uL 4.5-11.0     N          

  



             WBC)                                                

 

             RED BLOOD CELL (test code = 4.78 x10 6/uL 4.00-5.60    N           

 



             RBC)                                                

 

             HEMOGLOBIN (test code = HGB) 14.3 g/dL    12.5-16.9    N           

 

 

             HEMATOCRIT (test code = HCT) 41.7 %       37.5-50.7    N           

 

 

             MEAN CELL VOLUME (test code = 87.2 fL      81.0-99.0    N          

  



             MCV)                                                

 

             MEAN CELL HGB (test code = MCH) 29.9 pg      27.0-33.0    N        

    

 

             MEAN CELL HGB CONCETRATION 34.3 g/dL    33.0-37.0    N            



             (test code = MCHC)                                        

 

             RED CELL DISTRIBUTION WIDTH CV 13.2 %       11.5-14.5    N         

   



             (test code = RDW)                                        

 

             RED CELL DISTRIBUTION WIDTH SD 42.2 fL      37.0-54.0    N         

   



             (test code = RDW-SD)                                        

 

             PLATELET COUNT (test code = 211 x10 3/uL 150-400      N            



             PLT)                                                

 

             MEAN PLATELET VOLUME (test code 9.7 fL       7.0-9.0      H        

    



             = MPV)                                              

 

             NEUTROPHIL % (test code = NT%) 68.9 %       56.0-77.0    N         

   

 

             IMMATURE GRANULOCYTE % (test 1.0 %        0.0-2.0      N           

 



             code = IG%)                                         

 

             LYMPHOCYTE % (test code = LY%) 20.5 %       14.0-32.0    N         

   

 

             MONOCYTE % (test code = MO%) 5.7 %        4.8-9.0      N           

 

 

             EOSINOPHIL % (test code = EO%) 3.2 %        0.3-3.7      N         

   

 

             BASOPHIL % (test code = BA%) 0.7 %        0.0-2.0      N           

 

 

             NUCLEATED RBC % (test code = 0.0 %        0-0          N           

 



             NRBC%)                                              

 

             NEUTROPHIL # (test code = NT#) 6.22 x10 3/uL 2.0-7.6      N        

    

 

             IMMATURE GRANULOCYTE # (test 0.09 x10 3/uL 0.00-0.03    H          

  



             code = IG#)                                         

 

             LYMPHOCYTE # (test code = LY#) 1.85 x10 3/uL 1.0-3.8      N        

    

 

             MONOCYTE # (test code = MO#) 0.51 x10 3/uL 0.1-0.8      N          

  

 

             EOSINOPHIL # (test code = EO#) 0.29 x10 3/uL 0.0-0.2      H        

    

 

             BASOPHIL # (test code = BA#) 0.06 x10 3/uL 0.0-0.2      N          

  

 

             NUCLEATED RBC # (test code = 0.00 x10 3/uL 0.0-0.1      N          

  



             NRBC#)                                              

 

             MANUAL DIFF REQUIRED (test code NO                                 

    



             = URBANO)                                            



- XR CHEST [id number] -[id number] 00:00:00
[facility] : 01/10/1956  Sex: 
M************************************************************ FAX:Hal Chong [phone] [place]:  St: 
PRE---------------------------------------------------
---------------------------- Name: ALFONSO CHINCHILLA [facility] : 01/10/1956 
Age/S: 66/M [address] [place] [address] Unit #: [id number] [place]: [place] 
[address] Phys: Hal Barcenas MD Acct: [id number] Dis Date: Status: PRE [facility] [place]  [place]
 #: [phone] Exam Date: 2023 1045 FAX #: [phone] Reason: PREOP 
EXAMS:  CPT CODE: [id number] XR CHEST 2 V [id number] PROCEDURE INFORMATION: Exam: XR 
Chest Exam date and time: 2023 10:42 AM Age: 66 years old Clinical 
indication: Other: Preop TECHNIQUE: Imaging protocol: Radiologic exam of the 
chest. Views: 2 views. PA and Lateral COMPARISON: No relevant prior studies 
available. FINDINGS: Lungs: Hyperinflated emphysematous lungs associated with 
mild pleural/parenchymal scarring in both lung bases and lung apices. Small 
bulla are also noted in both lung bases. No consolidation is seen to suggest 
pneumonia. Small indeterminate pulmonary nodule measuring 8 mm peripherally in 
the right mid lung. Suspect faint nipple shadows in both lung bases. Pleural 
spaces: Minimal left pleural effusion or pleural thickening. No definite 
pneumothorax. Heart/Mediastinum: Heart size at the upper limits of normal. 
Bones/joints: Mild to moderate thoracic spondylosis and facet joint arthrosis. 
No acute fracture or dislocation. IMPRESSION: 1. Hyperinflated emphysematous nicho
gs associated with mild pleural/parenchymal scarring in both lung bases and lung
 apices. Small bullaare also noted in both lung bases. No consolidation is seen 
to suggest pneumonia. 2. Minimal left pleural effusion or pleural thickening. 3.
 Small indeterminate pulmonary nodule measuring 8 mm peripherally in the right 
mid lung. Follow-up CT chest would be helpful for better characterization given 
lack of comparison images demonstrating long-term stability. ** Electronically 
Signed by CALVIN aPrrish ** ** on 2023 at 1223 ** Reported and 
signed by: Donte Parrish M.D. CC: Hal Barcenas MD Technologist: Jessica Reyes, RT(CIRILO) Trnscrd Date/Time/By: 2023 (1223) : By: [email or web address] Orig Print 
D/T: S: 2023 (1223) PAGE 1 Signed ReportCulture: [id number] 
00:18:00





             Test Item    Value        Reference Range Interpretation Comments

 

             Culture: Anaerobic (test Culture In Progress                       

    



             code = Culture:                                        



             Anaerobic)                                          



[facility] [id number] 00:18:00





             Test Item    Value        Reference Range Interpretation Comments

 

             Gram Stain Report Rare WBC's No Organisms                          

 



             (test code = Gram Seen                                   



             Stain Report)                                        



[facility]ound/Abscess w/Gram [id number] 00:18:00





             Test Item    Value        Reference Range Interpretation Comments

 

             Culture: Wound/Abscess No Growth; Holding                          

 



             w/Gram Stain (test code =                                        



             Culture: Wound/Abscess                                        



             w/Gram Stain)                                        



[facility] KspliceCHEM [id number] 15:45:00





             Test Item    Value        Reference Range Interpretation Comments

 

             Glucose Lvl (test code = Glucose Lvl) 113          70-99           

          



[place] 116022-07-26 15:45:00





             Test Item    Value        Reference Range Interpretation Comments

 

             BUN (test code = BUN) 16           7-22                      



[place] 116022-07-26 15:45:00





             Test Item    Value        Reference Range Interpretation Comments

 

             Creatinine Lvl (test code = Creatinine 0.94         0.50-1.40      

           



             Lvl)                                                



[place] 116022-07-26 15:45:00





             Test Item    Value        Reference Range Interpretation Comments

 

             Sodium Lvl (test code = Sodium Lvl) 143          135-145           

        



[place] 116022-07-26 15:45:00





             Test Item    Value        Reference Range Interpretation Comments

 

             Potassium Lvl (test code = Potassium 4.3          3.5-5.1          

         



             Lvl)                                                



[place] 116022-07-26 15:45:00





             Test Item    Value        Reference Range Interpretation Comments

 

             Chloride Lvl (test code = Chloride Lvl) 112                  

            



[place] 116022-07-26 15:45:00





             Test Item    Value        Reference Range Interpretation Comments

 

             CO2 (test code = CO2) 29           24-32                     



[place] 116022-07-26 15:45:00





             Test Item    Value        Reference Range Interpretation Comments

 

             Calcium Lvl (test code = Calcium Lvl) 9.3          8.5-10.5        

          



[place] 116022-07-26 15:45:00





             Test Item    Value        Reference Range Interpretation Comments

 

             AGAP (test code = AGAP) 6.3          10.0-20.0                 



[place] 116022-07-26 15:45:00





             Test Item    Value        Reference Range Interpretation Comments

 

             eGFR (test code = eGFR) 89                                     



[place] 870182-07-26 15:45:00





             Test Item    Value        Reference Range Interpretation Comments

 

             WBC (test code = WBC) 9.4          3.7-10.4                  



[place] 00514-07-26 15:45:00





             Test Item    Value        Reference Range Interpretation Comments

 

             RBC (test code = RBC) 4.51         4.70-6.10                 



[place] 00514-07-26 15:45:00





             Test Item    Value        Reference Range Interpretation Comments

 

             Hgb (test code = Hgb) 13.7         14.0-18.0                 



[place] 00514-07-26 15:45:00





             Test Item    Value        Reference Range Interpretation Comments

 

             Hct (test code = Hct) 40.8         42.0-54.0                 



[place] [address]-07-26 15:45:00





             Test Item    Value        Reference Range Interpretation Comments

 

             MCV (test code = MCV) 90.5         80.0-94.0                 



[place] 00514-07-26 15:45:00





             Test Item    Value        Reference Range Interpretation Comments

 

             MCH (test code = MCH) 30.5 pg      27.0-31.0                 



[place] [address]14-07-26 15:45:00





             Test Item    Value        Reference Range Interpretation Comments

 

             MCHC (test code = MCHC) 33.7         32.0-36.0                 



[place] 870182-07-26 15:45:00





             Test Item    Value        Reference Range Interpretation Comments

 

             RDW (test code = RDW) 14.6         11.5-14.5                 



[place] [address]-07-26 15:45:00





             Test Item    Value        Reference Range Interpretation Comments

 

             Platelet (test code = Platelet) 240          133-450               

    



[place] 870182-07-26 15:45:00





             Test Item    Value        Reference Range Interpretation Comments

 

             MPV (test code = MPV) 7.1          7.4-10.4                  



[place] [address]-07-26 15:45:00





             Test Item    Value        Reference Range Interpretation Comments

 

             Segs (test code = Segs) 67.1         45.0-75.0                 



[place] [address]-07-26 15:45:00





             Test Item    Value        Reference Range Interpretation Comments

 

             Lymphocytes (test code = Lymphocytes) 21.6         20.0-40.0       

          



[place] [address]-07-26 15:45:00





             Test Item    Value        Reference Range Interpretation Comments

 

             Monocytes (test code = Monocytes) 6.8          2.0-12.0            

      



[place] [address]-07-26 15:45:00





             Test Item    Value        Reference Range Interpretation Comments

 

             Eosinophils (test code = 3.7          See_Comment                [A

utomated message] The



             Eosinophils)                                        system which ge

nerated



                                                                 this result tra

nsmitted



                                                                 reference range

: <=4.0.



                                                                 The reference r

lena was



                                                                 not used to int

erpret



                                                                 this result as



                                                                 normal/abnormal

.



[place] 870182-07-26 15:45:00





             Test Item    Value        Reference Range Interpretation Comments

 

             Basophils (test code = 0.8          See_Comment                [Aut

omated message] The



             Basophils)                                          system which ge

nerated



                                                                 this result tra

nsmitted



                                                                 reference range

: <=1.0.



                                                                 The reference r

lena was



                                                                 not used to int

erpret



                                                                 this result as



                                                                 normal/abnormal

.



[facility] [id number] 15:45:00





             Test Item    Value        Reference Range Interpretation Comments

 

             Neutrophils # (test code = Neutrophils 6.3          1.5-8.1        

           



             #)                                                  



[id number] 15:45:[facility]





             Test Item    Value        Reference Range Interpretation Comments

 

             Lymphocytes # (test code = Lymphocytes 2.0          1.0-5.5        

           



             #)                                                  



[facility] H[id number] 15:45:00





             Test Item    Value        Reference Range Interpretation Comments

 

             Monocytes # (test code 0.6          See_Comment                [Aut

omated message] The



             = Monocytes #)                                        system which 

generated



                                                                 this result tra

nsmitted



                                                                 reference range

: <=0.8.



                                                                 The reference r

lena was



                                                                 not used to int

erpret



                                                                 this result as



                                                                 normal/abnormal

.



[facility] [id number] 15:45:00





             Test Item    Value        Reference Range Interpretation Comments

 

             Eosinophils # (test code 0.3          See_Comment                [A

utomated message] The



             = Eosinophils #)                                        system whic

h generated



                                                                 this result tra

nsmitted



                                                                 reference range

: <=0.5.



                                                                 The reference r

lena was



                                                                 not used to int

erpret



                                                                 this result as



                                                                 normal/abnormal

.



[facility] H[id number] 15:45:00





             Test Item    Value        Reference Range Interpretation Comments

 

             Basophils # (test code 0.1          See_Comment                [Aut

omated message] The



             = Basophils #)                                        system which 

generated



                                                                 this result tra

nsmitted



                                                                 reference range

: <=0.2.



                                                                 The reference r

lena was



                                                                 not used to int

erpret



                                                                 this result as



                                                                 normal/abnormal

.



[facility] [id number] 15:45:00





             Test Item    Value        Reference Range Interpretation Comments

 

             Hgb A1C (test code = Hgb A1C) 6.3                                  

  



[facility] [id number] 17:00:00





             Test Item    Value        Reference Range Interpretation Comments

 

             Chloride Lvl (test code = Chloride Lvl) 114                  

            



[facility] [id number] 17:00:00





             Test Item    Value        Reference Range Interpretation Comments

 

             CO2 (test code = CO2) 24           24-32                     



[facility] [place] [id number] 17:00:00





             Test Item    Value        Reference Range Interpretation Comments

 

             Calcium Lvl (test code = Calcium Lvl) 9.1          8.5-10.5        

          



[place] 116022-05-05 17:00:00





             Test Item    Value        Reference Range Interpretation Comments

 

             AGAP (test code = AGAP) 10.0         10.0-20.0                 



[address]05-05 17:00:00





             Test Item    Value        Reference Range Interpretation Comments

 

             eGFR (test code = eGFR) 87                                     



[place] [address]-05-05 17:00:00





             Test Item    Value        Reference Range Interpretation Comments

 

             Segs (test code = Segs) 70.1         45.0-75.0                 



[place] [address]-05-05 17:00:00





             Test Item    Value        Reference Range Interpretation Comments

 

             Lymphocytes (test code = Lymphocytes) 20.2         20.0-40.0       

          



[place] [address]-05-05 17:00:00





             Test Item    Value        Reference Range Interpretation Comments

 

             Monocytes (test code = Monocytes) 6.3          2.0-12.0            

      



[place] [address]-05-05 17:00:00





             Test Item    Value        Reference Range Interpretation Comments

 

             Eosinophils (test code = 2.7          See_Comment                [A

utomated message] The



             Eosinophils)                                        system which ge

nerated



                                                                 this result tra

nsmitted



                                                                 reference range

: <=4.0.



                                                                 The reference r

lena was



                                                                 not used to int

erpret



                                                                 this result as



                                                                 normal/abnormal

.



[place] [address]-05-05 17:00:00





             Test Item    Value        Reference Range Interpretation Comments

 

             Basophils (test code = 0.7          See_Comment                [Aut

omated message] The



             Basophils)                                          system which ge

nerated



                                                                 this result tra

nsmitted



                                                                 reference range

: <=1.0.



                                                                 The reference r

lena was



                                                                 not used to int

erpret



                                                                 this result as



                                                                 normal/abnormal

.



[place] [address]-05-05 17:00:00





             Test Item    Value        Reference Range Interpretation Comments

 

             Neutrophils # (test code = Neutrophils 6.1          1.5-8.1        

           



             #)                                                  



[place] [address]-05-05 17:00:00





             Test Item    Value        Reference Range Interpretation Comments

 

             Lymphocytes # (test code = Lymphocytes 1.7          1.0-5.5        

           



             #)                                                  



[place] [address]-05-05 17:00:00





             Test Item    Value        Reference Range Interpretation Comments

 

             Monocytes # (test code 0.5          See_Comment                [Aut

omated message] The



             = Monocytes #)                                        system which 

generated



                                                                 this result tra

nsmitted



                                                                 reference range

: <=0.8.



                                                                 The reference r

lena was



                                                                 not used to int

erpret



                                                                 this result as



                                                                 normal/abnormal

.



[id number]





             Test Item    Value        Reference Range Interpretation Comments

 

             Eosinophils # (test code 0.2          See_Comment                [A

utomated message] The



             = Eosinophils #)                                        system whic

h generated



                                                                 this result tra

nsmitted



                                                                 reference range

: <=0.5.



                                                                 The reference r

lena was



                                                                 not used to int

erpret



                                                                 this result as



                                                                 normal/abnormal

.



[facility]UL2359[facility]





             Test Item    Value        Reference Range Interpretation Comments

 

             Basophils # (test code 0.1          See_Comment                [Aut

omated message] The



             = Basophils #)                                        system which 

generated



                                                                 this result tra

nsmitted



                                                                 reference range

: <=0.2.



                                                                 The reference r

lena was



                                                                 not used to int

erpret



                                                                 this result as



                                                                 normal/abnormal

.



[facility]SS[facility]14[facility]:00





             Test Item    Value        Reference Range Interpretation Comments

 

             WBC (test code = WBC) 8.6          3.7-10.4                  



[facility] HospitalXdwaypjTVHPDVWEQU9153-64-73 17:00:00





             Test Item    Value        Reference Range Interpretation Comments

 

             RBC (test code = RBC) 4.48         4.70-6.10                 



[facility] HospitalHzafqljZZREVVBUKF4563-72-33 17:00:00





             Test Item    Value        Reference Range Interpretation Comments

 

             Hgb (test code = Hgb) 13.7         14.0-18.0                 



[place] 870182-05-05 17:00:00





             Test Item    Value        Reference Range Interpretation Comments

 

             Hct (test code = Hct) 40.6         42.0-54.0                 



[place] 870182-05-05 17:00:00





             Test Item    Value        Reference Range Interpretation Comments

 

             MCV (test code = MCV) 90.7         80.0-94.0                 



[facility] HospitalAnqcampVRHMWFOZNM6133-41-58 17:00:00





             Test Item    Value        Reference Range Interpretation Comments

 

             MCH (test code = MCH) 30.6 pg      27.0-31.0                 



[facility] HospitalKnnxtjsMBCMIGHZRZ2695-96-94 17:00:00





             Test Item    Value        Reference Range Interpretation Comments

 

             MCHC (test code = MCHC) 33.7         32.0-36.0                 



[facility]4145[facility]87-62 [facility]:00





             Test Item    Value        Reference Range Interpretation Comments

 

             RDW (test code = RDW) 15.0         11.5-14.5                 



[facility]1277[facility]





             Test Item    Value        Reference Range Interpretation Comments

 

             Platelet (test code = Platelet) 219          133-450               

    



[facility] Center[facility]mhjtfrPDBUHQGCDN1244-55-21 17:00:00





             Test Item    Value        Reference Range Interpretation Comments

 

             MPV (test code = MPV) 8.4          7.4-10.4                  



[facility] LSSDZWCCS8001-67-98 17:00:00





             Test Item    Value        Reference Range Interpretation Comments

 

             Hgb A1C (test code = Hgb A1C) 6.0                                  

  



[facility] [id number] 17:00:00





             Test Item    Value        Reference Range Interpretation Comments

 

             Glucose Lvl (test code = Glucose Lvl) 97           70-99           

          



[facility] [id number] 17:00:00





             Test Item    Value        Reference Range Interpretation Comments

 

             BUN (test code = BUN) 21           7-22                      



[facility] [id number] 17:00:00





             Test Item    Value        Reference Range Interpretation Comments

 

             Creatinine Lvl (test code = Creatinine 0.91         0.50-1.40      

           



             Lvl)                                                



[facility] [id number] 17:00:00





             Test Item    Value        Reference Range Interpretation Comments

 

             Sodium Lvl (test code = Sodium Lvl) 144          135-145           

        



[facility] [id number] 17:00:00





             Test Item    Value        Reference Range Interpretation Comments

 

             Potassium Lvl (test code = Potassium 4.0          3.5-5.1          

         



             Lvl)                                                



[facility]- CT LOWER EXTRM W/O C [id number] 10:22:00 Patient Name: 
ALFONSO CHINCHILLA Unit No: [id number]  EXAMS: CPT CODE: [id number] CT LOWER [place]TR[place] W/[place] [address] CT OF THE LEFT ANKLE WITH SAGITTAL AND CORONAL RECONSTRUCTIONS 
DIAGNOSIS: Screws traverse the tibiotalar joint. There is no evidence for bony 
fusion. COMMENT: COMPARISON: No prior exams available.Scans were performed with 
thin sections and reconstructions were obtained. CT radiation dose optimization 
is achieved for this examination by the use of a CT protocol in accordance with 
ACR practice standards and adherence to 's recommendations. 
Postsurgical changes as described. 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 
without fusion. ** Electronically Signed by Richard Bermudez MD ** ** on 
2020 at 1022 ** Reported and signed by: Richard Bermudez MD CC: Horace Ames MD  Technologist: Chon Adair,RT(R) CTDI: DLP: Trnscrpt: 
2020 (1022) t.SDR.[facility] NAME: ALFONSO CHINCHILLA [address] 
[facility] PHYS: Horace Gamboa MD : 1955 AGE: 65SEX: M 
[place] [address] ACCT NO: [id number] LOC: Y.RAD PHONE #: [phone] EXAM
DATE: 2020 STATUS: REG CLI FAX #: [phone]  RAD #: D/C DT PAGE 1 
Signed Report Patient Name: ALFONSO CHINCHILLA Unit No: [id number]  EXAMS: CPT CODE: 
[id number] CT LOWER EXTRM W/[place] [address] (Continued) Orig Print D/T: S: 
2020 (1025)  [facility] NAME: ALFONSO CHINCHILLA [address] [facility] 
PHYS: Horace Gamboa MD : 1955 AGE: 65 SEX: M [place] [address] ACCT NO: [id number] LOC: Y.RAD PHONE #: [phone] EXAM DATE: 
2020 STATUS: REG CLI FAX #: [phone] RAD #: D/C DT PAGE 2  Signed 
ReportBASI METABOLIC [id number] 17:49:00





             Test Item    Value        Reference Range Interpretation Comments

 

             SODIUM (test code = 143 mmol/L   136-145      N            



             NA)                                                 

 

             POTASSIUM (test code = 4.1 mmol/L   3.5-5.1      N            



             K)                                                  

 

             CHLORIDE (test code = 106.0 mmol/L        N            



             CL)                                                 

 

             CARBON DIOXIDE (test 23.6 mmol/L  21-32        N            



             code = CO2)                                         

 

             GLUCOSE (test code = 77 mg/dL            N            



             GLU)                                                

 

             BLOOD UREA NITROGEN 16 mg/dL     7-18         N            



             (test code = BUN)                                        

 

             GLOMERULAR FILTRATION 78.9         >60                       Unit o

f measure:



             RATE (test code = GFR)                                        mL/mi

n/1.73



                                                                 [id number]



                                                                 Range:Healthy



                                                                 Adults >90



                                                                 mL/min/1.73 m2 

For



                                                                 Chronic Kidney



                                                                 Disease: Stage 

II



                                                                 Mild Decrease i

n



                                                                 GFR 60-90 Stage

 III



                                                                 Moderate Decrea

se



                                                                 in GFR 30-59  S

tage



                                                                 IV Severe Decre

ase



                                                                 in GFR 15-29 St

age



                                                                 V Kidney Failur

e



                                                                 <15

 

             CREATININE (test code 0.96 mg/dL   0.55-1.30    N            



             = CREAT)                                            

 

             CALCIUM (test code = 9.0 mg/dL    8.2-10.1     N            



             CA)

## 2023-04-30 NOTE — RAD REPORT
EXAM DESCRIPTION:  RAD - Chest Single View - 4/30/2023 8:43 pm

 

CLINICAL HISTORY:  COPD

 

COMPARISON:  Chest Pa And Lat (2 Views) dated 11/29/2022; Chest Pa And Lat (2 Views) dated 6/5/2019; 
Abdomen 1 View (KUB) dated 6/7/2016; Abdomen 1 View (KUB) dated 5/22/2016Chest Pa And Lat (2 Views) d
ated 11/29/2022; Chest Pa And Lat (2 Views) dated 6/5/2019; Abdomen 1 View (KUB) dated 6/7/2016; Abdo
men 1 View (KUB) dated 5/22/2016; Stone Protocol dated 7/15/2016

 

FINDINGS:  Lines: None.

Lungs: Diffuse increased prominence of the pulmonary interstitium.

Pleural: Small left pleural effusion.

Cardiac: Cardiomegaly.

Mediastinum: Within normal limits.

Bones: No acute fractures.

Other: None

 

IMPRESSION:  Mild increased prominence of the pulmonary interstitium which could reflect developing e
samaria or atypical infectious process.

## 2023-05-01 VITALS — SYSTOLIC BLOOD PRESSURE: 126 MMHG | OXYGEN SATURATION: 96 % | DIASTOLIC BLOOD PRESSURE: 67 MMHG

## 2023-05-01 VITALS — TEMPERATURE: 97.7 F

## 2023-05-01 NOTE — EDPHYS
Physician Documentation                                                                           

 [facility]                                                                 

Name: Cleveland Chinchilla                                                                                

Age: 68 yrs                                                                                       

Sex: Male                                                                                         

: 1955                                                                                   

MRN: [id number]                                                                                   

Arrival Date: 2023                                                                          

Time: 19:44                                                                                       

Account#: [id number]                                                                            

Bed 5                                                                                             

Private MD:                                                                                       

ED Physician Sravan Tejeda                                                                    

HPI:                                                                                              

                                                                                             

19:47 This 68 yrs old  Male presents to ER via [facility] with complaints of         sp4 

      dizziness, nausea, near syncope.                                                            

19:48 68-year-old male with a history of diabetes, COPD, coronary artery disease presents     sp4 

      from home after he felt dizzy, lightheaded, and almost passed out after he did some         

      work in the backyard. Patient decided to go outside smoking cigarettes and felt unwell      

      and near syncopal. Patient's wife administered sublingual nitroglycerin tablet although     

      patient denied any chest pain. On arrival with EMS patient states he is feeling             

      nauseated and unwell. Patient denied chest pain, patient states he is chronic smoker        

      with history of COPD but he is not on home oxygen. .                                        

                                                                                                  

Historical:                                                                                       

- Allergies:                                                                                      

19:52 No Known Allergies;                                                                     as6 

- PMHx:                                                                                           

19:52 COPD; Hyperlipidemia; Hypertension; restless leg syndrome; Sleep Apnea; WPW- corrected  as6 

      with cardiac ablation;                                                                      

                                                                                                  

- Immunization history:: Client reports receiving the 2nd dose of the Covid vaccine,              

  moderna.                                                                                        

- Social history:: Patient/guardian denies using alcohol, street drugs, IV drugs,                 

  caffeine, over the counter diet medications, Smoking status: Patient reports the use            

  of cigarette tobacco products, smokes one pack cigarettes per day.                              

- Family history:: not pertinent.                                                                 

                                                                                                  

                                                                                                  

ROS:                                                                                              

19:48 Constitutional: Negative for fever, chills, and weight loss, positive for generalized   sp4 

      weakness, dizziness, near syncope Eyes: Negative for injury, pain, redness, and             

      discharge, ENT: Negative for injury, pain, and discharge, Neck: Negative for injury,        

      pain, and swelling, Cardiovascular: Negative for chest pain, palpitations, and edema,       

      positive for near syncopal episode Respiratory: Negative for shortness of breath,           

      cough, wheezing, and pleuritic chest pain, Abdomen/GI: Negative for abdominal pain,         

      vomiting, diarrhea, and constipation, positive for nausea negative for vomiting Back:       

      Negative for injury and pain, : Negative for injury, bleeding, discharge, and             

      swelling, MS/Extremity: Negative for injury and deformity, Skin: Negative for injury,       

      rash, and discoloration, Neuro: Negative for headache, weakness, numbness, tingling,        

      and seizure, Psych: Negative for depression, anxiety,  Allergy/Immunology: Negative for     

      hives, rash, and allergies   Endocrine: Negative for neck swelling, polydipsia,             

      polyuria, polyphagia, and weight changes Hematologic/Lymphatic: Negative for swollen        

      nodes, abnormal bleeding, and unusual bruising                                              

                                                                                                  

Exam:                                                                                             

19:48 Constitutional:  This is a well developed, well nourished patient who is awake, alert,  sp4 

      uncomfortable appearing male with generalized weakness, not able to stand up at this        

      time.   Mild distress secondary to nausea Head/Face:  Normocephalic, atraumatic. Eyes:      

      Pupils equal round and reactive to light, extra-ocular motions intact.  Lids and lashes     

      normal.  Conjunctiva and sclera are not injected.  Cornea within normal limits.             

      Periorbital areas with no swelling, redness, or edema. ENT:  Nares patent. No nasal         

      discharge, no septal abnormalities noted.  Tympanic membranes are normal and external       

      auditory canals are clear.  Oropharynx with no redness, swelling, or masses, exudates,      

      or evidence of obstruction, uvula midline.  Mucous membranes moist. Neck:  Trachea          

      midline, no thyromegaly or masses palpated, and no cervical lymphadenopathy.  Supple,       

      full range of motion without nuchal rigidity, or vertebral point tenderness.  No            

      Meningismus. Chest/axilla:  Normal chest wall appearance and motion.  Nontender with no     

      deformity.  No lesions are appreciated. Cardiovascular:  Regular rate and rhythm with a     

      normal S1 and S2.  No gallops, murmurs, or rubs.  Normal PMI, no JVD.  No pulse             

      deficits. Respiratory:  Lungs have equal breath sounds bilaterally, clear to                

      auscultation and percussion.  No rales, rhonchi or wheezes noted.  No increased work of     

      breathing, no retractions or nasal flaring. Abdomen/GI:  Soft, non-tender, with normal      

      bowel sounds.  No distension or tympany.  No guarding or rebound.  No evidence of           

      tenderness throughout. Back:  No spinal tenderness.  No costovertebral tenderness.          

      Skin:  Warm, dry with normal turgor.  with no rashes, no lesions, and no evidence of        

      cellulitis.  Pale skin MS/ Extremity:  Pulses equal, no cyanosis.  Neurovascular            

      intact.  Full, normal range of motion. Neuro:  Awake and alert, GCS 15, oriented to         

      person, place, time, and situation.  Cranial nerves II-XII grossly intact.  Motor           

      strength 5/5 in all extremities.  Sensory grossly intact.  Psych:  Awake, alert, with       

      orientation to person, place and time.  Behavior, mood, and affect are within normal        

      limits                                                                                      

19:55 ECG was reviewed by the Attending Physician. EKG time ,   ventricular rate 64 bpm,  sp4 

      normal sinus rhythm, no ST elevation or depression, no acute acute ischemia, no signs       

      of ectopy                                                                                   

                                                                                                  

Vital Signs:                                                                                      

19:52  / 70; Pulse 66; Resp 20 S; Temp 97.7(O); Pulse Ox 93% on R/A; Weight 104.33 kg   as6 

      (R); Height 6 ft. 3 in. (R); Pain 0/10;                                                     

20:00  / 58; Pulse 62; Resp 13; Pulse Ox 95% on 2 lpm NC;                               jb4 

21:45  / 54; Pulse 73; Resp 14; Pulse Ox 96% on R/A;                                    jb4 

23:00  / 64; Pulse 76; Resp 21 S; Pulse Ox 97% on R/A;                                  as6 

05                                                                                             

00:09  / 67; Pulse 74; Resp 17 S; Pulse Ox 96% on R/A;                                  as6 

                                                                                             

19:52 Body Mass Index 28.75 (104.33 kg, 190.5 cm)                                             as6 

                                                                                             

19:52 Pain Scale: Adult                                                                       as6 

                                                                                             

20:00 Pt placed on 2L NC while sleeping, reports normally wearing Cpap at home.               jb4 

                                                                                                  

MDM:                                                                                              

20:15 Patient medically screened.                                                             sp4 

23:59 Differential Diagnosis altered mental status. Data reviewed: vital signs, nurses notes, sp4 

      EMS record, old medical records, lab test result(s), EKG, radiologic studies, plain         

      films. Consideration of Admission/Observation Escalation of care including                  

      admission/observation considered. ED course: P troponin is negative, repeat EKG reveals     

      normal sinus rhythm, there is incomplete right bundle branch block. Patient states he       

      is feeling back to normal and he would like to be released home. Patient denied chest       

      pain or shortness of breath, denied dizziness and states he is feeling much better.         

      Patient will be discharged home with as needed medicine for nausea and advised to           

      practice bedrest for the next 12 hours..                                                    

                                                                                                  

                                                                                             

19:47 Order name: Basic Metabolic Panel; Complete Time: 22:02                                 sp4 

                                                                                             

19:47 Order name: CBC with Diff; Complete Time: 20:25                                         sp4 

                                                                                             

19:47 Order name: LFT's; Complete Time: 22:02                                                 sp4 

                                                                                             

19:47 Order name: NT PRO-BNP; Complete Time: 22:02                                            sp4 

                                                                                             

19:47 Order name: Troponin HS; Complete Time: 22:02                                           sp4 

                                                                                             

22:28 Order name: Troponin High Sensitivity; Complete Time: 23:57                             sp4 

                                                                                             

19:47 Order name: XRAY Chest (1 view); Complete Time: 22:02                                   sp4 

                                                                                             

19:47 Order name: EKG; Complete Time: 19:48                                                   sp4 

                                                                                             

19:47 Order name: Cardiac monitoring; Complete Time: 19:52                                    sp4 

                                                                                             

19:47 Order name: EKG - Nurse/Tech; Complete Time: 19:54                                      sp4 

                                                                                             

19:47 Order name: IV Saline Lock; Complete Time: 19:52                                        sp4 

                                                                                             

19:47 Order name: Labs collected and sent; Complete Time: 20:08                               sp4 

                                                                                             

19:47 Order name: O2 Per Protocol; Complete Time: 19:52                                       sp4 

                                                                                             

19:47 Order name: O2 Sat Monitoring; Complete Time: 19:52                                     sp4 

                                                                                             

22:28 Order name: EKG - Nurse/Tech; Complete Time: 23:35                                      sp4 

                                                                                                  

EC:55 Rate is 84 beats/min. Rhythm is regular, Normal Sinus Rhythm. QRS Axis is Normal. MT    sp4 

      interval is normal. QRS interval is normal. T waves are Normal. No ST changes noted.        

      Clinical impression: No evidence of ischemia. Interpreted by me.                            

                                                                                                  

Administered Medications:                                                                         

20:30 Drug: Ondansetron IVP 8 mg Route: IVP; Site: left hand;                                                                                                                              

00:11 Follow up: Response: No adverse reaction                                                as6 

                                                                                             

20:36 Drug: Lactated Ringers Solution IV 1000 ml Route: IV; Rate: 150 bolus; Site: left hand;                                                                                              

00:11 Follow up: Response: No adverse reaction; IV Status: Completed infusion; IV Intake:     as6 

      1000ml                                                                                      

                                                                                             

20:48 Drug: Meclizine PO 25 mg Route: PO;                                                     jb4 

                                                                                             

00:11 Follow up: Response: No adverse reaction                                                as6 

                                                                                             

20:48 Drug: metoCLOPramide IVP 10 mg Route: IVP; Site: left hand;                             jb4 

                                                                                             

00:11 Follow up: Response: No adverse reaction                                                as6 

                                                                                             

22:32 Drug: Potassium Chloride PO 40 mEq Route: PO;                                           jb4 

                                                                                             

00:11 Follow up: Response: No adverse reaction                                                as6 

                                                                                                  

                                                                                                  

Disposition Summary:                                                                              

23 00:01                                                                                    

Discharge Ordered                                                                                 

      Location: Home                                                                          sp4 

      Problem: new                                                                            sp4 

      Symptoms: have improved                                                                 sp4 

      Condition: Stable                                                                       sp4 

      Diagnosis                                                                                   

        - Syncope Near                                                                        sp4 

        - Acute dizziness, near syncopal episode, hypokalemia, nausea without vomit           sp4 

      Followup:                                                                               sp4 

        - With: Private Physician                                                                  

        - When: 1 - 2 days                                                                         

        - Reason: Recheck today's complaints                                                       

      Discharge Instructions:                                                                     

        - Discharge Summary Sheet                                                             sp4 

        - Near-Syncope                                                                        sp4 

      Forms:                                                                                      

        - Thank You Letter                                                                    sp4 

      Prescriptions:                                                                              

        - Zofran 4 mg Oral Tablet                                                                  

            - take 1 tablet by ORAL route every 6 hours As needed; 20 tablet; Refills: 0,     sp4 

      Product Selection Permitted                                                                 

Signatures:                                                                                       

Dispatcher [employer]                           Chidi Conroy RN                       RN   jb4                                                  

Bentley Garcia RN RN   as6                                                  

Sravan Tejeda MD MD   sp4                                                  

                                                                                                  

**************************************************************************************************

## 2023-05-01 NOTE — ER
Nurse's Notes                                                                                     

 [facility]                                                                 

Name: Cleveland Chinchilla                                                                                

Age: 68 yrs                                                                                       

Sex: Male                                                                                         

: 1955                                                                                   

MRN: [id number]                                                                                   

Arrival Date: 2023                                                                          

Time: 19:44                                                                                       

Account#: [id number]                                                                            

Bed 5                                                                                             

Private MD:                                                                                       

Diagnosis: Syncope Near;Acute dizziness, near syncopal episode, hypokalemia, nausea without vomit 

                                                                                                  

Presentation:                                                                                     

                                                                                             

19:52 Chief complaint: EMS states: called out for a syncopal episode. Coronavirus screen: At  as6 

      this time, the client does not indicate any symptoms associated with coronavirus-19.        

      Ebola Screen: No symptoms or risks identified at this time. Initial Sepsis Screen: Does     

      the patient meet any 2 criteria? No. Patient's initial sepsis screen is negative. Does      

      the patient have a suspected source of infection? No. Patient's initial sepsis screen       

      is negative. Risk Assessment: Do you want to hurt yourself or someone else? Patient         

      reports no desire to harm self or others. Onset of symptoms was 2023.             

19:52 Acuity: SOM 3                                                                           as6 

19:52 Method Of Arrival: EMS: [facility]                                                as6 

19:54 Care prior to arrival: Medication(s) given: Normal saline infusion, 1000 mL, IV         as6 

      initiated. 18 GA, in the left forearm.                                                      

                                                                                                  

Historical:                                                                                       

- Allergies:                                                                                      

19:52 No Known Allergies;                                                                     as6 

- PMHx:                                                                                           

19:52 COPD; Hyperlipidemia; Hypertension; restless leg syndrome; Sleep Apnea; WPW- corrected  as6 

      with cardiac ablation;                                                                      

                                                                                                  

- Immunization history:: Client reports receiving the 2nd dose of the Covid vaccine,              

  moderna.                                                                                        

- Social history:: Patient/guardian denies using alcohol, street drugs, IV drugs,                 

  caffeine, over the counter diet medications, Smoking status: Patient reports the use            

  of cigarette tobacco products, smokes one pack cigarettes per day.                              

- Family history:: not pertinent.                                                                 

                                                                                                  

                                                                                                  

Screenin/01                                                                                             

00:09 [facility] ED Fall Risk Assessment (Adult) Score/Fall Risk Level 0 - 2 = Low Risk. Abuse  as6 

      screen: Denies threats or abuse. Denies injuries from another. Nutritional screening:       

      No deficits noted. Tuberculosis screening: No symptoms or risk factors identified.          

                                                                                                  

Assessment:                                                                                       

                                                                                             

20:00 General: Appears in no apparent distress. comfortable, Behavior is calm, cooperative,   jb4 

      appropriate for age. Pain: Denies pain. Neuro: Level of Consciousness is awake, alert,      

      obeys commands, Oriented to person, place, time, situation, Reports dizziness.              

      Cardiovascular: Patient's skin is warm and dry. Respiratory: Airway is patent               

      Respiratory effort is even, unlabored, Respiratory pattern is regular, symmetrical. GI:     

      No signs and/or symptoms were reported involving the gastrointestinal system. : No        

      signs and/or symptoms were reported regarding the genitourinary system. EENT: No signs      

      and/or symptoms were reported regarding the EENT system. Derm: Skin is intact, Skin is      

      pink, warm \T\ dry. Musculoskeletal: Circulation, motion, and sensation intact. Range of    

      motion: intact in all extremities.                                                          

20:20 Reassessment: Verified order for 8mg of Zofran with provider due to EKG reading         jb4 

      reporting prolonged QT interval, provider states that medication order is safe and to       

      give it and other meds as ordered.                                                          

22:01 Reassessment: Patient appears in no apparent distress at this time. Patient and/or      jb4 

      family updated on plan of care and expected duration. Pain level reassessed. Patient is     

      alert, oriented x 3, equal unlabored respirations, skin warm/dry/pink.                      

                                                                                                  

Vital Signs:                                                                                      

19:52  / 70; Pulse 66; Resp 20 S; Temp 97.7(O); Pulse Ox 93% on R/A; Weight 104.33 kg   as6 

      (R); Height 6 ft. 3 in. (R); Pain 0/10;                                                     

20:00  / 58; Pulse 62; Resp 13; Pulse Ox 95% on 2 lpm NC;                               jb4 

21:45  / 54; Pulse 73; Resp 14; Pulse Ox 96% on R/A;                                    jb4 

23:00  / 64; Pulse 76; Resp 21 S; Pulse Ox 97% on R/A;                                  as6 

                                                                                             

00:09  / 67; Pulse 74; Resp 17 S; Pulse Ox 96% on R/A;                                  as6 

                                                                                             

19:52 Body Mass Index 28.75 (104.33 kg, 190.5 cm)                                             as6 

                                                                                             

19:52 Pain Scale: Adult                                                                       as                                                                                             

20:00 Pt placed on 2L NC while sleeping, reports normally wearing Cpap at home.               jb4 

                                                                                                  

ED Course:                                                                                        

19:45 Patient arrived in ED.                                                                  rv1 

19:46 Potepalov, Sravan, MD is Attending Physician.                                           sp4 

19:52 Bentley Garcia, RN is Primary Nurse.                                                    as6 

19:52 Arm band placed on.                                                                     as6 

19:54 Triage completed.                                                                       as6 

20:08 Basic Metabolic Panel Sent.                                                             jb4 

20:08 LFT's Sent.                                                                             4 

20:08 CBC with Diff Sent.                                                                     4 

20:08 NT PRO-BNP Sent.                                                                        4 

20:08 Troponin HS Sent.                                                                       4 

20:45 XRAY Chest (1 view) In Process Unspecified.                                             EDMS

23:35 Troponin High Sensitivity Sent.                                                                                                                                                      

00:09 Bed in low position. Call light in reach. Side rails up X2.                             as6 

00:10 No provider procedures requiring assistance completed. IV discontinued, intact,         as6 

      bleeding controlled, No redness/swelling at site. Pressure dressing applied.                

                                                                                                  

Administered Medications:                                                                         

                                                                                             

20:30 Drug: Ondansetron IVP 8 mg Route: IVP; Site: left hand;                                                                                                                              

00:11 Follow up: Response: No adverse reaction                                                as6 

                                                                                             

20:36 Drug: Lactated Ringers Solution IV 1000 ml Route: IV; Rate: 150 bolus; Site: left hand;                                                                                              

00:11 Follow up: Response: No adverse reaction; IV Status: Completed infusion; IV Intake:     as6 

      1000ml                                                                                      

                                                                                             

20:48 Drug: Meclizine PO 25 mg Route: PO;                                                                                                                                                  

00:11 Follow up: Response: No adverse reaction                                                as6 

                                                                                             

20:48 Drug: metoCLOPramide IVP 10 mg Route: IVP; Site: left hand;                                                                                                                          

00:11 Follow up: Response: No adverse reaction                                                as6 

                                                                                             

22:32 Drug: Potassium Chloride PO 40 mEq Route: PO;                                                                                                                                        

00:11 Follow up: Response: No adverse reaction                                                as6 

                                                                                                  

                                                                                                  

Medication:                                                                                       

00:09 VIS not applicable for this client.                                                     as6 

                                                                                                  

Intake:                                                                                           

00:11 IV: 1000ml; Total: 1000ml.                                                              as6 

                                                                                                  

Outcome:                                                                                          

00:01 Discharge ordered by MD.                                                                sp4 

00:10 Discharged to home ambulatory, with significant other.                                  as6 

00:10 Condition: stable                                                                           

00:10 Discharge instructions given to patient, significant other, Instructed on discharge     as6 

      instructions, follow up and referral plans. medication usage, Demonstrated                  

      understanding of instructions, follow-up care, medications, Prescriptions given X 1.        

00:12 Patient left the ED.                                                                    as6 

                                                                                                  

Signatures:                                                                                       

Dispatcher [employer]                           EDChidi Burch RN                       RN   jb4                                                  

Bentley Garcia RN                      RN   as6                                                  

Lalitha Hoffmann                            rv1                                                  

Sravan Tejeda MD MD   sp4                                                  

                                                                                                  

**************************************************************************************************

## 2023-05-02 NOTE — EKG
Test Date:    2023-04-30               Test Time:    19:52:56

Technician:   COOPER                                    

                                                     

MEASUREMENT RESULTS:                                       

Intervals:                                           

Rate:         64                                     

WA:           184                                    

QRSD:         108                                    

QT:           454                                    

QTc:          468                                    

Axis:                                                

P:            52                                     

WA:           184                                    

QRS:          -24                                    

T:            47                                     

                                                     

INTERPRETIVE STATEMENTS:                                       

                                                     

Normal sinus rhythm

Incomplete right bundle branch block

ST & T wave abnormality, consider anterior ischemia

Prolonged QT

Abnormal ECG

Compared to ECG 11/29/2022 09:37:13

Possible ischemia now present

Prolonged QT interval now present

ST (T wave) deviation still present



Electronically Signed On 05-02-23 08:11:07 CDT by Diogo Garvey

## 2023-05-03 NOTE — EKG
Test Date:    2023-04-30               Test Time:    23:08:08

Technician:   COOPER                                    

                                                     

MEASUREMENT RESULTS:                                       

Intervals:                                           

Rate:         73                                     

KY:           176                                    

QRSD:         114                                    

QT:           426                                    

QTc:          469                                    

Axis:                                                

P:            12                                     

KY:           176                                    

QRS:          65                                     

T:            9                                      

                                                     

INTERPRETIVE STATEMENTS:                                       

                                                     

Normal sinus rhythm

Incomplete right bundle branch block

ST & T wave abnormality, consider anterior ischemia

Prolonged QT

Abnormal ECG

Compared to ECG 04/30/2023 19:52:56

No significant changes



Electronically Signed On 05-03-23 14:09:27 CDT by Hal Barcenas

## 2023-05-15 LAB
BUN BLD-MCNC: 17 MG/DL (ref 7–18)
GLUCOSE SERPLBLD-MCNC: 319 MG/DL (ref 74–106)
HCT VFR BLD CALC: 38.5 % (ref 39.6–49)
INR BLD: 0.89
LYMPHOCYTES # SPEC AUTO: 1.8 K/UL (ref 0.7–4.9)
MCV RBC: 88.1 FL (ref 80–100)
PMV BLD: 8.2 FL (ref 7.6–11.3)
POTASSIUM SERPL-SCNC: 4.2 MEQ/L (ref 3.5–5.1)
RBC # BLD: 4.37 M/UL (ref 4.33–5.43)

## 2023-05-16 NOTE — EKG
Test Date:    2023-05-15               Test Time:    12:53:01

Technician:   BENEDICT                                     

                                                     

MEASUREMENT RESULTS:                                       

Intervals:                                           

Rate:         71                                     

OK:           174                                    

QRSD:         102                                    

QT:           386                                    

QTc:          419                                    

Axis:                                                

P:            60                                     

OK:           174                                    

QRS:          -16                                    

T:            79                                     

                                                     

INTERPRETIVE STATEMENTS:                                       

                                                     

Normal sinus rhythm

Incomplete right bundle branch block

ST & T wave abnormality, consider anterior ischemia

Abnormal ECG

Compared to ECG 04/30/2023 23:08:08

Prolonged QT interval no longer present

ST (T wave) deviation still present

Possible ischemia still present



Electronically Signed On 05-16-23 07:45:51 CDT by Diogo Garvey

## 2023-05-19 ENCOUNTER — HOSPITAL ENCOUNTER (OUTPATIENT)
Dept: HOSPITAL 97 - OR | Age: 68
Discharge: HOME | End: 2023-05-19
Attending: INTERNAL MEDICINE
Payer: COMMERCIAL

## 2023-05-19 VITALS — DIASTOLIC BLOOD PRESSURE: 44 MMHG | OXYGEN SATURATION: 98 % | SYSTOLIC BLOOD PRESSURE: 102 MMHG

## 2023-05-19 DIAGNOSIS — Z79.4: ICD-10-CM

## 2023-05-19 DIAGNOSIS — I10: ICD-10-CM

## 2023-05-19 DIAGNOSIS — Z79.899: ICD-10-CM

## 2023-05-19 DIAGNOSIS — Z79.02: ICD-10-CM

## 2023-05-19 DIAGNOSIS — I25.10: Primary | ICD-10-CM

## 2023-05-19 DIAGNOSIS — Z82.49: ICD-10-CM

## 2023-05-19 DIAGNOSIS — Z87.891: ICD-10-CM

## 2023-05-19 DIAGNOSIS — I45.19: ICD-10-CM

## 2023-05-19 DIAGNOSIS — I73.9: ICD-10-CM

## 2023-05-19 DIAGNOSIS — Z95.5: ICD-10-CM

## 2023-05-19 DIAGNOSIS — E11.9: ICD-10-CM

## 2023-05-19 DIAGNOSIS — E78.5: ICD-10-CM

## 2023-05-19 DIAGNOSIS — I71.20: ICD-10-CM

## 2023-05-19 PROCEDURE — 82947 ASSAY GLUCOSE BLOOD QUANT: CPT

## 2023-05-19 PROCEDURE — 85025 COMPLETE CBC W/AUTO DIFF WBC: CPT

## 2023-05-19 PROCEDURE — 36415 COLL VENOUS BLD VENIPUNCTURE: CPT

## 2023-05-19 PROCEDURE — 85347 COAGULATION TIME ACTIVATED: CPT

## 2023-05-19 PROCEDURE — 85610 PROTHROMBIN TIME: CPT

## 2023-05-19 PROCEDURE — 93454 CORONARY ARTERY ANGIO S&I: CPT

## 2023-05-19 PROCEDURE — 85730 THROMBOPLASTIN TIME PARTIAL: CPT

## 2023-05-19 PROCEDURE — 76937 US GUIDE VASCULAR ACCESS: CPT

## 2023-05-19 PROCEDURE — 93571 IV DOP VEL&/PRESS C FLO 1ST: CPT

## 2023-05-19 PROCEDURE — 93005 ELECTROCARDIOGRAM TRACING: CPT

## 2023-05-19 PROCEDURE — 80048 BASIC METABOLIC PNL TOTAL CA: CPT

## 2023-05-19 NOTE — OP
Date of Procedure:  05/19/2023



Surgeon:  RICHARD CORNEJO



Procedures Performed:  

1.Selective coronary angiogram.

2.Fractional flow reserve of mid left anterior descending moderate stenosis, was insignificant 0.82.




Indication:  Exertional chest pain with abnormal stress test.



Access:  Right radial artery 6-Grenadian closed with TR band.



Complications:  None.



Bleeding:  Less than 20 mL.



Description Of Procedure:  After risks, benefits, and alternatives were explained, the patient agreed
 to the procedure and signed informed consent.  Patient was brought to the cardiac catheterization la
boratory, prepped and draped in sterile fashion.  Then, I accessed right radial artery using pediatri
c micropuncture kit.  Placed 6-Grenadian Slender sheath, took 5-Grenadian Tiger 4.0 catheter into the aorti
c root.  Over a J-wire, engaged left main and the right coronary artery, took standard views and gave
 systemic heparin to assure HCT level above 250 throughout the procedure.  Took a 6-Grenadian XB 3.5 lef
t guide into the aortic root over a J-wire, engaged left main and took a pressure FFR wire into the a
ortic root and pressures were equalized and then the wire was advanced across the area of stenosis an
d FFR was measured using Lexiscan.  It was 0.82.  Pulling the wire back, there was no drift and the f
inal angiogram was satisfactory and then removed the guide and the sheath and placed TR band with goo
d hemostasis.



Findings:  

1.Left main is large and normal.

2.LAD:  Proximal segment appears to be normal.  Then, at the bifurcation of the diagonal 1, distal t
o it there is 60% stenosis.  FFR was negative at 0.82 and the diagonal 1 branch is large and then it 
bifurcates into vessels.  Each one of them has proximal between 50% to 70% and then the LAD mid segme
nt appears to be okay until the distal segment is focal 40% stenosis.

3.Left circumflex is very large and dominant with luminal irregularities.

4.RCA:  It is nondominant circulation, but it is a rather large vessel proximal 60% and mid diffuse 
60% and then patent distal stent that goes all the way to the PDA and this is the stent in PDA that h
as 50% stenosis.



Conclusion:  Moderate coronary artery disease, specifically the mid LAD.  FFR was -0.82.



Plan:  Aspirin, Plavix, and statin and aggressive medical management.  Plan for stress test in 6 chrissy
hs.





SR/MODL

DD:  05/19/2023 13:43:59Voice ID:  [id number]

DT:  05/19/2023 22:30:05Report ID:  [id number]